# Patient Record
Sex: FEMALE | Race: WHITE | Employment: OTHER
[De-identification: names, ages, dates, MRNs, and addresses within clinical notes are randomized per-mention and may not be internally consistent; named-entity substitution may affect disease eponyms.]

---

## 2017-08-15 ENCOUNTER — HOSPITAL ENCOUNTER (OUTPATIENT)
Dept: PHYSICAL THERAPY | Facility: CLINIC | Age: 67
Setting detail: THERAPIES SERIES
Discharge: HOME | End: 2017-08-15
Payer: MEDICARE

## 2017-08-15 PROCEDURE — G0283 ELEC STIM OTHER THAN WOUND: HCPCS

## 2017-08-15 PROCEDURE — 97140 MANUAL THERAPY 1/> REGIONS: CPT

## 2017-08-15 PROCEDURE — G8978 MOBILITY CURRENT STATUS: HCPCS

## 2017-08-15 PROCEDURE — 97161 PT EVAL LOW COMPLEX 20 MIN: CPT

## 2017-08-15 PROCEDURE — G8979 MOBILITY GOAL STATUS: HCPCS

## 2017-08-15 PROCEDURE — 97110 THERAPEUTIC EXERCISES: CPT

## 2017-08-17 ENCOUNTER — HOSPITAL ENCOUNTER (OUTPATIENT)
Dept: PHYSICAL THERAPY | Facility: CLINIC | Age: 67
Setting detail: THERAPIES SERIES
Discharge: HOME | End: 2017-08-17
Payer: MEDICARE

## 2017-08-17 PROCEDURE — 97110 THERAPEUTIC EXERCISES: CPT

## 2017-08-17 PROCEDURE — G0283 ELEC STIM OTHER THAN WOUND: HCPCS

## 2017-08-22 ENCOUNTER — HOSPITAL ENCOUNTER (OUTPATIENT)
Dept: PHYSICAL THERAPY | Facility: CLINIC | Age: 67
Setting detail: THERAPIES SERIES
Discharge: HOME | End: 2017-08-22
Payer: MEDICARE

## 2017-08-22 PROCEDURE — G0283 ELEC STIM OTHER THAN WOUND: HCPCS

## 2017-08-22 PROCEDURE — 97110 THERAPEUTIC EXERCISES: CPT

## 2017-08-25 ENCOUNTER — HOSPITAL ENCOUNTER (OUTPATIENT)
Dept: PHYSICAL THERAPY | Facility: CLINIC | Age: 67
Setting detail: THERAPIES SERIES
Discharge: HOME | End: 2017-08-25
Payer: MEDICARE

## 2017-08-25 PROCEDURE — 97110 THERAPEUTIC EXERCISES: CPT

## 2017-08-25 PROCEDURE — G0283 ELEC STIM OTHER THAN WOUND: HCPCS

## 2017-08-28 ENCOUNTER — HOSPITAL ENCOUNTER (OUTPATIENT)
Dept: PHYSICAL THERAPY | Facility: CLINIC | Age: 67
Setting detail: THERAPIES SERIES
Discharge: HOME | End: 2017-08-28
Payer: MEDICARE

## 2017-08-28 PROCEDURE — 97110 THERAPEUTIC EXERCISES: CPT

## 2017-08-28 PROCEDURE — 97032 APPL MODALITY 1+ESTIM EA 15: CPT

## 2017-08-30 ENCOUNTER — APPOINTMENT (OUTPATIENT)
Dept: PHYSICAL THERAPY | Facility: CLINIC | Age: 67
End: 2017-08-30
Payer: MEDICARE

## 2017-08-31 ENCOUNTER — HOSPITAL ENCOUNTER (OUTPATIENT)
Dept: PHYSICAL THERAPY | Facility: CLINIC | Age: 67
Setting detail: THERAPIES SERIES
Discharge: HOME | End: 2017-08-31
Payer: MEDICARE

## 2017-08-31 PROCEDURE — G0283 ELEC STIM OTHER THAN WOUND: HCPCS

## 2017-08-31 PROCEDURE — 97110 THERAPEUTIC EXERCISES: CPT

## 2017-09-05 ENCOUNTER — APPOINTMENT (OUTPATIENT)
Dept: PHYSICAL THERAPY | Facility: CLINIC | Age: 67
End: 2017-09-05
Payer: MEDICARE

## 2017-09-06 ENCOUNTER — HOSPITAL ENCOUNTER (OUTPATIENT)
Dept: PHYSICAL THERAPY | Facility: CLINIC | Age: 67
Setting detail: THERAPIES SERIES
Discharge: HOME | End: 2017-09-06
Payer: MEDICARE

## 2017-09-06 PROCEDURE — 97110 THERAPEUTIC EXERCISES: CPT

## 2017-09-06 PROCEDURE — G0283 ELEC STIM OTHER THAN WOUND: HCPCS

## 2017-09-06 PROCEDURE — 97113 AQUATIC THERAPY/EXERCISES: CPT

## 2017-09-12 ENCOUNTER — HOSPITAL ENCOUNTER (OUTPATIENT)
Dept: PHYSICAL THERAPY | Facility: CLINIC | Age: 67
Setting detail: THERAPIES SERIES
Discharge: HOME | End: 2017-09-12
Payer: MEDICARE

## 2017-09-12 PROCEDURE — 97110 THERAPEUTIC EXERCISES: CPT

## 2017-09-12 PROCEDURE — G0283 ELEC STIM OTHER THAN WOUND: HCPCS

## 2017-09-14 ENCOUNTER — HOSPITAL ENCOUNTER (OUTPATIENT)
Dept: PHYSICAL THERAPY | Facility: CLINIC | Age: 67
Setting detail: THERAPIES SERIES
Discharge: HOME | End: 2017-09-14
Payer: MEDICARE

## 2017-12-15 ENCOUNTER — HOSPITAL ENCOUNTER (OUTPATIENT)
Dept: MRI IMAGING | Age: 67
Discharge: HOME | End: 2017-12-15
Payer: MEDICARE

## 2017-12-15 DIAGNOSIS — G57.31 RIGHT PERONEAL NERVE PALSY: ICD-10-CM

## 2017-12-15 PROCEDURE — 73721 MRI JNT OF LWR EXTRE W/O DYE: CPT

## 2018-04-09 ENCOUNTER — HOSPITAL ENCOUNTER (OUTPATIENT)
Dept: PHYSICAL THERAPY | Facility: CLINIC | Age: 68
Setting detail: THERAPIES SERIES
Discharge: HOME | End: 2018-04-09
Payer: MEDICARE

## 2018-04-09 PROCEDURE — G8979 MOBILITY GOAL STATUS: HCPCS

## 2018-04-09 PROCEDURE — 97161 PT EVAL LOW COMPLEX 20 MIN: CPT

## 2018-04-09 PROCEDURE — 97016 VASOPNEUMATIC DEVICE THERAPY: CPT

## 2018-04-09 PROCEDURE — G8978 MOBILITY CURRENT STATUS: HCPCS

## 2018-04-09 PROCEDURE — 97110 THERAPEUTIC EXERCISES: CPT

## 2018-04-11 ENCOUNTER — HOSPITAL ENCOUNTER (OUTPATIENT)
Dept: PHYSICAL THERAPY | Facility: CLINIC | Age: 68
Setting detail: THERAPIES SERIES
Discharge: HOME | End: 2018-04-11
Payer: MEDICARE

## 2018-04-11 PROCEDURE — 97016 VASOPNEUMATIC DEVICE THERAPY: CPT

## 2018-04-11 PROCEDURE — 97110 THERAPEUTIC EXERCISES: CPT

## 2018-04-13 ENCOUNTER — HOSPITAL ENCOUNTER (OUTPATIENT)
Dept: PHYSICAL THERAPY | Facility: CLINIC | Age: 68
Setting detail: THERAPIES SERIES
Discharge: HOME | End: 2018-04-13
Payer: MEDICARE

## 2018-04-13 PROCEDURE — 97110 THERAPEUTIC EXERCISES: CPT

## 2018-04-13 PROCEDURE — 97016 VASOPNEUMATIC DEVICE THERAPY: CPT

## 2018-04-17 ENCOUNTER — HOSPITAL ENCOUNTER (OUTPATIENT)
Dept: PHYSICAL THERAPY | Facility: CLINIC | Age: 68
Setting detail: THERAPIES SERIES
Discharge: HOME | End: 2018-04-17
Payer: MEDICARE

## 2018-04-17 PROCEDURE — 97016 VASOPNEUMATIC DEVICE THERAPY: CPT

## 2018-04-17 PROCEDURE — 97110 THERAPEUTIC EXERCISES: CPT

## 2018-04-18 ENCOUNTER — HOSPITAL ENCOUNTER (OUTPATIENT)
Dept: PHYSICAL THERAPY | Facility: CLINIC | Age: 68
Setting detail: THERAPIES SERIES
Discharge: HOME | End: 2018-04-18
Payer: MEDICARE

## 2018-04-18 PROCEDURE — 97110 THERAPEUTIC EXERCISES: CPT

## 2018-04-18 PROCEDURE — 97016 VASOPNEUMATIC DEVICE THERAPY: CPT

## 2018-04-20 ENCOUNTER — HOSPITAL ENCOUNTER (OUTPATIENT)
Dept: PHYSICAL THERAPY | Facility: CLINIC | Age: 68
Setting detail: THERAPIES SERIES
Discharge: HOME | End: 2018-04-20
Payer: MEDICARE

## 2018-04-20 PROCEDURE — 97016 VASOPNEUMATIC DEVICE THERAPY: CPT

## 2018-04-20 PROCEDURE — 97110 THERAPEUTIC EXERCISES: CPT

## 2018-04-24 ENCOUNTER — HOSPITAL ENCOUNTER (OUTPATIENT)
Dept: PHYSICAL THERAPY | Facility: CLINIC | Age: 68
Setting detail: THERAPIES SERIES
Discharge: HOME | End: 2018-04-24
Payer: MEDICARE

## 2018-04-24 PROCEDURE — 97110 THERAPEUTIC EXERCISES: CPT

## 2018-04-24 PROCEDURE — 97016 VASOPNEUMATIC DEVICE THERAPY: CPT

## 2018-04-25 ENCOUNTER — HOSPITAL ENCOUNTER (OUTPATIENT)
Dept: PHYSICAL THERAPY | Facility: CLINIC | Age: 68
Setting detail: THERAPIES SERIES
Discharge: HOME | End: 2018-04-25
Payer: MEDICARE

## 2018-04-25 PROCEDURE — 97016 VASOPNEUMATIC DEVICE THERAPY: CPT

## 2018-04-25 PROCEDURE — 97110 THERAPEUTIC EXERCISES: CPT

## 2018-04-27 ENCOUNTER — HOSPITAL ENCOUNTER (OUTPATIENT)
Dept: PHYSICAL THERAPY | Facility: CLINIC | Age: 68
Setting detail: THERAPIES SERIES
Discharge: HOME | End: 2018-04-27
Payer: MEDICARE

## 2018-04-27 PROCEDURE — 97016 VASOPNEUMATIC DEVICE THERAPY: CPT

## 2018-04-27 PROCEDURE — 97110 THERAPEUTIC EXERCISES: CPT

## 2018-05-01 ENCOUNTER — HOSPITAL ENCOUNTER (OUTPATIENT)
Dept: PHYSICAL THERAPY | Facility: CLINIC | Age: 68
Setting detail: THERAPIES SERIES
Discharge: HOME | End: 2018-05-01
Payer: MEDICARE

## 2018-05-01 PROCEDURE — G8979 MOBILITY GOAL STATUS: HCPCS

## 2018-05-01 PROCEDURE — 97016 VASOPNEUMATIC DEVICE THERAPY: CPT

## 2018-05-01 PROCEDURE — G8978 MOBILITY CURRENT STATUS: HCPCS

## 2018-05-01 PROCEDURE — 97110 THERAPEUTIC EXERCISES: CPT

## 2018-05-03 ENCOUNTER — HOSPITAL ENCOUNTER (OUTPATIENT)
Dept: PHYSICAL THERAPY | Facility: CLINIC | Age: 68
Setting detail: THERAPIES SERIES
Discharge: HOME | End: 2018-05-03
Payer: MEDICARE

## 2018-05-03 PROCEDURE — 97110 THERAPEUTIC EXERCISES: CPT

## 2018-05-03 PROCEDURE — 97016 VASOPNEUMATIC DEVICE THERAPY: CPT

## 2018-05-04 ENCOUNTER — HOSPITAL ENCOUNTER (OUTPATIENT)
Dept: PHYSICAL THERAPY | Facility: CLINIC | Age: 68
Setting detail: THERAPIES SERIES
Discharge: HOME | End: 2018-05-04
Payer: MEDICARE

## 2018-05-04 PROCEDURE — 97110 THERAPEUTIC EXERCISES: CPT

## 2018-05-04 PROCEDURE — 97016 VASOPNEUMATIC DEVICE THERAPY: CPT

## 2018-05-08 ENCOUNTER — HOSPITAL ENCOUNTER (OUTPATIENT)
Dept: PHYSICAL THERAPY | Facility: CLINIC | Age: 68
Setting detail: THERAPIES SERIES
Discharge: HOME | End: 2018-05-08
Payer: MEDICARE

## 2018-05-08 PROCEDURE — 97110 THERAPEUTIC EXERCISES: CPT

## 2018-05-08 PROCEDURE — 97016 VASOPNEUMATIC DEVICE THERAPY: CPT

## 2018-05-10 ENCOUNTER — HOSPITAL ENCOUNTER (OUTPATIENT)
Dept: PHYSICAL THERAPY | Facility: CLINIC | Age: 68
Setting detail: THERAPIES SERIES
Discharge: HOME | End: 2018-05-10
Payer: MEDICARE

## 2018-05-10 PROCEDURE — 97016 VASOPNEUMATIC DEVICE THERAPY: CPT

## 2018-05-10 PROCEDURE — 97110 THERAPEUTIC EXERCISES: CPT

## 2018-05-15 ENCOUNTER — HOSPITAL ENCOUNTER (OUTPATIENT)
Dept: PHYSICAL THERAPY | Facility: CLINIC | Age: 68
Setting detail: THERAPIES SERIES
Discharge: HOME | End: 2018-05-15
Payer: MEDICARE

## 2018-05-15 PROCEDURE — 97110 THERAPEUTIC EXERCISES: CPT

## 2018-05-15 PROCEDURE — 97016 VASOPNEUMATIC DEVICE THERAPY: CPT

## 2018-05-23 ENCOUNTER — HOSPITAL ENCOUNTER (OUTPATIENT)
Dept: PHYSICAL THERAPY | Facility: CLINIC | Age: 68
Setting detail: THERAPIES SERIES
Discharge: HOME | End: 2018-05-23
Payer: MEDICARE

## 2018-05-23 PROCEDURE — 97016 VASOPNEUMATIC DEVICE THERAPY: CPT

## 2018-05-23 PROCEDURE — 97110 THERAPEUTIC EXERCISES: CPT

## 2018-05-25 ENCOUNTER — HOSPITAL ENCOUNTER (OUTPATIENT)
Dept: PHYSICAL THERAPY | Facility: CLINIC | Age: 68
Setting detail: THERAPIES SERIES
Discharge: HOME | End: 2018-05-25
Payer: MEDICARE

## 2018-05-25 PROCEDURE — 97016 VASOPNEUMATIC DEVICE THERAPY: CPT

## 2018-05-25 PROCEDURE — 97110 THERAPEUTIC EXERCISES: CPT

## 2018-05-29 ENCOUNTER — HOSPITAL ENCOUNTER (OUTPATIENT)
Dept: PHYSICAL THERAPY | Facility: CLINIC | Age: 68
Setting detail: THERAPIES SERIES
Discharge: HOME | End: 2018-05-29
Payer: MEDICARE

## 2018-05-29 PROCEDURE — 97110 THERAPEUTIC EXERCISES: CPT

## 2018-05-29 PROCEDURE — 97016 VASOPNEUMATIC DEVICE THERAPY: CPT

## 2018-05-31 ENCOUNTER — HOSPITAL ENCOUNTER (OUTPATIENT)
Dept: PHYSICAL THERAPY | Facility: CLINIC | Age: 68
Setting detail: THERAPIES SERIES
Discharge: HOME | End: 2018-05-31
Payer: MEDICARE

## 2018-05-31 PROCEDURE — 97110 THERAPEUTIC EXERCISES: CPT

## 2018-05-31 PROCEDURE — 97016 VASOPNEUMATIC DEVICE THERAPY: CPT

## 2018-05-31 PROCEDURE — G8979 MOBILITY GOAL STATUS: HCPCS

## 2018-05-31 PROCEDURE — 97530 THERAPEUTIC ACTIVITIES: CPT

## 2018-05-31 PROCEDURE — G8980 MOBILITY D/C STATUS: HCPCS

## 2020-08-31 ENCOUNTER — HOSPITAL ENCOUNTER (OUTPATIENT)
Dept: PHYSICAL THERAPY | Facility: CLINIC | Age: 70
Setting detail: THERAPIES SERIES
Discharge: HOME | End: 2020-08-31
Payer: MEDICARE

## 2020-08-31 PROCEDURE — 97110 THERAPEUTIC EXERCISES: CPT

## 2020-08-31 PROCEDURE — 97161 PT EVAL LOW COMPLEX 20 MIN: CPT

## 2020-08-31 NOTE — CONSULTS
[] [facility]) - [facility] &  Therapy  [address]  [phone]  F: [phone] [] [address]  [place] [address]   [address]  P: [phone]  F: [phone] [] [address] &  Therapy  [address] [facility]  P: [phone]  F: [phone] [] [address]  [facility]   [address]   [place]: [phone]  F: [phone]      Physical Therapy General Evaluation    Date:  2020  Patient: Roman Rivas  : 1950  MRN: [id number]  Physician: Fay Liu     Insurance: Medicare  Medical Diagnosis: R26.89- imbalance     Rehab Codes: R26.89  Onset Date: 2020- fall                                 Next 's appt: TBD- pending success with PT services    Subjective:   CC: Reports x1 fall at home onto carpet on 2020- with resulting L hip pain. Also reports previous L TKA with PT services with relief, however, unable to undergo R TKA secondary to COVID-19 and resulting weight gain. Denies all other falls as of recent- fell from low chair onto sand over weekend- no sig change in pain. Denies previous PT services for imbalance. Relief with recent injection for R knee- potential synvisc. Current L hip pain- diagnosed as bursitis- unable to perform interventions issued by referring MD secondary to imbalance and inc pain, difficulty. No recent injections [synvisc] for L hip bursitis- no relief with previous cortisone injections. HPI: 2020; see above     PMHx: [] Unremarkable [] Diabetes [x] HTN  [] Pacemaker   [] MI/Heart Problems [] Cancer [x] Arthritis [x] Other: hearing problems. [x] Refer to full medical chart  In EPIC     Tests: [x] X-Ray: [] MRI:  [] Other: \"bone spur. \"     Medications: [x] Refer to full medical record [] None [] Other: denies use of pain medication for L hip at present. Allergies:      [x] Refer to full medical record [] None [] Other:     Function:  Hand Dominance  [x] Right  [] Left  Employer -   Job Status []  Normal duty   [] Light duty   [] Off due to condition    [x]  Retired   [] Not employed   [] Disability  [] Other:  []  Return to work: Work activities/duties -     Gait Prior level of function Current level of function    [x] Independent  [] Assist [x] Independent  [] Assist   Device: [] Independent [] Independent    [] Straight Cane [] Quad cane [] Straight Cane [] Quad cane    [] Standard walker [] Rolling walker   [] 4 wheeled walker [] Standard walker [] Rolling walker   [] 4 wheeled walker    [] Wheelchair [] Wheelchair     Pain:  [x] Yes  [] No Location: L lat hip- GT, bursa, into ITB  Pain Rating: (0-10 scale) 6/10  Pain altered Tx:  [] Yes  [x] No  Action: N/A  Symptoms:  [x] Improving [] Worsening [] Same  Better:  [] AM    [] PM    [] Sit    [] Rise/Sit    []Stand    [] Walk    [] Lying    [] Other:  Worse: [] AM    [] PM    [] Sit    [x] Rise/Sit    [x]Stand    [x] Walk    [] Lying    [] Bend                      [] Valsalva    [] Other: stair negotiation- inc B UE A; limited in babysitting as well. Sleep: [] OK    [x] Disturbed- unable to sleep in L S/L    Reports inc B foot N/T as of recent- denies lumbar spine history, no recent imaging- instructed to monitor and notify us of changes. Reports recent inc in shuffling gait. Objective:      ROM  ° A/P STRENGTH      Left Right Left Right     Shoulder Flex         Abd         Elbow Flex          Ext          Wrist Flex         Ext         Hip IR/ER   4-/4- 4/4     Hip Flex   4- 4     Ext   - -      Abd   4- with pain/4 4/4      Knee Flex   4 4      Ext   4 4+      Ankle DF   4+ 4+      PF   4+ 4+        OBSERVATION No Deficit Deficit Not Tested Comments   Posture       Forward Head [] [x] []    Rounded Shoulders [] [x] []    Kyphosis [] [x] [] Inc cerv, thoracic.    Lordosis [x] [] [] and consider a follow-up visit with said physician. Patient verbalized understanding and agreement to all aforementioned statements. Patient would benefit from skilled physical therapy services in order to: inc L ITB/TFL/piriformis flexibility; L LE gross strength; and overall sit<>stand, standing, ambulation and stair negotiation tolerance. Problems:    [x] ? Pain:  [x] ? ROM:  [x] ? Strength:  [x] ? Function:  [] Other:      STG: (to be met in 6 treatments)  1. ? Pain: Patient will report < 6/10 pain with active movement in order to improve QOL. 2. ? ROM: Will demo dec trunk de-rotation and compensations with < 6/10 pain during L britney's test in order to indicate inc L ITB/TFL flexibility. Will perform all L ITB/TFL/piriformis stretches through inc ROM, with dec difficulty. 3. ? Strength: Will demo 4/5 gross L LE strength with < 3/10 P in order to better match R.   4. ? Function: Will demo > 12 reps 30\" sit<>stand and with < 3/10 B knee pain in order to indicate dec fall risk at home. 5. Patient to be independent with home exercise program as demonstrated by performance with correct form without cues. 6. Demonstrate Knowledge of fall prevention  LTG: (to be met in 12 treatments)  1. Will demo GOOD to FAIR conditions 3-4 mCTSIB in order to indicate improved static standing balance, with dec fall risk at home. 2. Patient will report decreased TTP to L hip GT, bursa, ITB in order to indicate decreased inflammation and improved healing of injured site. 3. Will stand, ambulate, negotiate stairs for 10-15 min each with < 3/10 pain and dec UE A in order to improve QOL, return to fitness. 4. Improve LEFS to 23/80    Patient goals: \"get better balance. \"     Rehab Potential:  [x] Good  [] Fair  [] Poor   Suggested Professional Referral:  [x] No  [] Yes:  Barriers to Goal Achievement:  [] No  [x] Yes: prolonged or chronic history of current condition; history of x1/2 falls; concurrent R knee pain; h/o L TKA.   Domestic Concerns:  [x] No  [] Yes:    Pt. Education:  [x] Plans/Goals, Risks/Benefits discussed  [x] Home exercise program  Method of Education: [x] Verbal  [x] Demo  [x] Written  Comprehension of Education:  [x] Verbalizes understanding. [] Demonstrates understanding. [x] Needs Review. [] Demonstrates/verbalizes understanding of HEP/Ed previously given. Treatment Plan:  [x] Therapeutic Exercise   11027  [] Iontophoresis: 4 mg/mL Dexamethasone Sodium Phosphate  mAmin  91462   [x] Therapeutic Activity  28492 [] Vasopneumatic cold with compression  89575    [] Gait Training   14981 [] Ultrasound   12893   [x] Neuromuscular Re-education  34918 [] Electrical Stimulation Unattended  70060   [x] Manual Therapy  98583 [] Electrical Stimulation Attended  00711   [x] Instruction in HEP  [] Lumbar/Cervical Traction  00671   [x] Aquatic Therapy   75180 [x] Cold/hotpack    [] Massage   60821      [] Dry Needling, 1 or 2 muscles  80095   [] Biofeedback, first 15 minutes   95771  [] Biofeedback, additional 15 minutes   43887 [] Dry Needling, 3 or more muscles  40314     []  Medication allergies reviewed for use of    Dexamethasone Sodium Phosphate 4mg/ml     with iontophoresis treatments. Pt is not allergic. Frequency:  2 x/week for 12 visits    Todays Treatment:  Modalities: x1 aquatic, x1 land combo treatments for L hip GT bursitis and imbalance; FALL RISK- x1/2 isolated falls in 5/1/2020  Exercises: incorporate balance re-training; plans to begin weight management to work toward R TKA  Exercise Reps/ Time Weight/ Level Comments   Supine L piriformis str. - ER/IR- modified/to tolerance  X15\" ea  Towel to support R lower leg- for dec tenderness   HEP   Supine SKTC str. With towel A X15\" ea  HEP   S/L clam- L x10 AROM HEP   Supine L ITB str.  With belt   HEP- review next land visit    Education   HOLD- standing L ITB stretching, traditional figure four stretch secondary to sig difficulty- may progress to

## 2020-09-03 ENCOUNTER — HOSPITAL ENCOUNTER (OUTPATIENT)
Dept: PHYSICAL THERAPY | Facility: CLINIC | Age: 70
Setting detail: THERAPIES SERIES
Discharge: HOME | End: 2020-09-03
Payer: MEDICARE

## 2020-09-03 PROCEDURE — 97113 AQUATIC THERAPY/EXERCISES: CPT

## 2020-09-03 NOTE — FLOWSHEET NOTE
[] [facility]) - [facility] &  Therapy  [address]  [phone]  F: [phone] [] [address]  [place] [address]   [address]  P: [phone]  F: [phone] [x] [address] &  Therapy  [address]  P: [phone]  F: [phone] [] [address]  [facility]   [address]   [place]: [phone]  F: [phone]      Physical Therapy Daily Treatment Note    Date:  9/3/2020  Patient Name:  Filiberto Wade    :  1950  MRN: [id number]  Physician: Daniela Arce                            Insurance: Medicare  Medical Diagnosis: R26.89- imbalance                     Rehab Codes: R26.89  Onset Date: 2020- fall                                 Next 's appt: TBD- pending success with PT services   Visit# / total visits: , Progress note for Medicare patient due at visit 10     Cancels/No Shows: 0/0    Subjective:    Pain:  [x] Yes  [] No Location: L hip  Pain Rating: (0-10 scale) 4/10  Pain altered Tx:  [x] No  [] Yes  Action:  Comments: Pt reports feeling more sore in her hip after evaluation.     Objective:  KEY  B = Belt G = Gloves P= Paddles   C = Collar K = Kickboard T = Theratube   DB = Dumbells N = Noodle W = Weights     Exercises/Activities  Warm-up/Amb 9/3 Dynamic Exercises 9/3   Forward 3L March 3L   Sideways 3L Squat    Backwards 3L Retro HS curls      Retro SLR    Stretches  Braiding    Gastroc/Soleus  Heel to Toe amb    Hamstring 3x30\" B Toe amb    Hip flexor  Heel amb    Piriformis      SKTC      Pec Stretch      Post Deltoid  Static Exercises UE      Shoulder flex/ext 15   Static Exercises LE  Shoulder abd/add 15   Heel/toe raises 15 Shoulder H.  abd/add 15   Marches 15 Shoulder IR/ER    Mini-squats 15 Rowing    4-way hip  15 Arm Circles    Hamstring curls 15 UT shrugs/rolls    Hip Circles/Fig 8  Scap squeezes    Ankle ROM  Diagonals 1/2    Lunges  15 B Elbow flex/ext      Pron/Sup    Functional Exercise  Wrist AROM    Step - fwd/lat 15 ea      Wall Push-ups  Deep H20/    SLS 2x20\" ea Bike 3m   Breast Stroke on Noodle  Hip abd/add 3m   Noodle Twist  Hip flex/ext    Noodle Push down  Hip IR/ER    Kickboard push/pull  Knee flex/ext      Push/pull on Rail      Hang 5-10m   Other:    Treatment Charges: Mins Units   []  Modalities     []  Ther Exercise     []  Manual Therapy     []  Ther Activities     [x]  Aquatics 30 2   []  Vasocompression     []  Other     Total Treatment time       Medicare- $161.15 - updated 9/3    Assessment: [x] Progressing toward goals.  Initiated aquatic exercises above with good tolerance. Education provided on proper depth to complete ex in along with demonstration of all. Cues throughout for TA activation for strengthening and to assist with balance. Pt reports \"feeling great\" post session. [] No change. [] Other:  [x] Patient would continue to benefit from skilled physical therapy services in order to: decrease pain, increase ROM and strength to improve QOL    STG: (to be met in 6 treatments)  1. ? Pain: Patient will report < 6/10 pain with active movement in order to improve QOL. 2. ? ROM: Will demo dec trunk de-rotation and compensations with < 6/10 pain during L britney's test in order to indicate inc L ITB/TFL flexibility. Will perform all L ITB/TFL/piriformis stretches through inc ROM, with dec difficulty. 3. ? Strength: Will demo 4/5 gross L LE strength with < 3/10 P in order to better match R.   4. ? Function: Will demo > 12 reps 30\" sit<>stand and with < 3/10 B knee pain in order to indicate dec fall risk at home. 5. Patient to be independent with home exercise program as demonstrated by performance with correct form without cues. 6. Demonstrate Knowledge of fall prevention  LTG: (to be met in 12 treatments)  1.  Will demo GOOD to FAIR conditions

## 2020-09-08 ENCOUNTER — HOSPITAL ENCOUNTER (OUTPATIENT)
Dept: PHYSICAL THERAPY | Facility: CLINIC | Age: 70
Setting detail: THERAPIES SERIES
Discharge: HOME | End: 2020-09-08
Payer: MEDICARE

## 2020-09-08 PROCEDURE — 97110 THERAPEUTIC EXERCISES: CPT

## 2020-09-08 PROCEDURE — 97140 MANUAL THERAPY 1/> REGIONS: CPT

## 2020-09-08 NOTE — FLOWSHEET NOTE
[] [facility]) - [facility] &  Therapy  [address]  [phone]  F: [phone] [] [address]  [place] [address]   [address]  P: [phone]  F: [phone] [x] [address] &  Therapy  [address]  P: [phone]  F: [phone] [] [address]  [facility]   [address]   [place]: [phone]  F: [phone]      Physical Therapy Daily Treatment Note    Date:  2020  Patient Name:  Khadra Vigil    :  1950  MRN: [id number]  Physician: April Matos                            Insurance: Medicare  Medical Diagnosis: R26.89- imbalance                     Rehab Codes: R26.89  Onset Date: 2020- fall                                 Next 's appt: TBD- pending success with PT services   Visit# / total visits: 3/12, Progress note for Medicare patient due at visit 10                                   Cancels/No Shows: 0/0    Subjective:    Pain:  [x] Yes  [] No Location: L hip GT bursa Pain Rating: (0-10 scale) unrated/10  Pain altered Tx:  [] No  [x] Yes  Action: SEE MODIFICATIONS BELOW   Comments: Reports sig relief with first aquatic PT treatment. Reports sig inc pain with performing L piriformis stretches at home- will review today. Plans to participate in adult open swim on / for further relief of symptoms. Reports able to sleep in L S/L since start of PT services. Todays Treatment:  Modalities: x1 aquatic, x1 land combo treatments for L hip GT bursitis and imbalance; FALL RISK- x1/2 isolated falls in 2020  Exercises: incorporate balance re-training; plans to begin weight management to work toward R TKA  Exercise Reps/ Time Weight/ Level Comments   Supine L piriformis str. - ER/IR- modified/to tolerance  X15\" ea   Towel to support R lower leg- for dec tenderness HEP- reviewed  To 30\"- 9/8/2020- x4- ER/IR  Towel A for IR- improved ROM compared with eval    Supine SKTC str. With towel A X15\" ea   HEP- reviewed   To 30\"- 9/8/2020- x4   S/L clam- L X20, 5\" ea AROM HEP- reviewed  B- instructed to perform through dec ROM, no breaks    Reverse clam X20, 5\" AROM B  New- 9/8/2020   Supine bridge  X20, 3\"  New- 9/8/2020   Supine L ITB str. With belt     HEP- reviewed   Education     HOLD- standing L ITB stretching, traditional figure four stretch secondary to sig difficulty- may progress to in future; also aquatic PT services- rationale, expectations; precautions/contraindications; x1 aquatic, x1 land combo treatments- verbalized understanding to all    Issue fall prevention handouts     COMPLETED- 9/8/2020   Nu-step x7' L4 B UE/LE                       Other:    Foam roller to L ITB/TFL/piriformis/glutes with 1/2 black FR and MOD OP- reports relief- x10' total      Specific Instructions for next treatment: Issue new, updated HEP handout next land visit. x1 aquatic, x1 land combo treatments for L hip GT bursitis and imbalance starting next visit. Treatment Charges: Mins Units   []  Modalities     [x]  Ther Exercise 35 2   [x]  Manual Therapy 10 1   []  Ther Activities     []  Aquatics     []  Vasocompression     []  Other     Total Treatment time 45 3     Medicare- $224.09- updated 9/8    [x7' on nu-step]    Assessment: [x] Progressing toward goals. Pt presents with unrated L hip pain, and reports sig relief post first aquatic PT treatment. Therefore, began session with nu-step warm-up, and proceeded with review of all HEP interventions in order to ensure proper form. Able to progress to performing 30 second hold durations of all L hip active stretches. Also able to perform S/L clam, reverse clam on B LEs this date; as well as supine bridges- without inc pain. Finished with foam roller to L hip musculature for inc tissue flexibility and pain management- reports relief. Consider ideas above and below next visit. [] No change. [] Other:  [x] Patient would continue to benefit from skilled physical therapy services in order to: inc L ITB/TFL/piriformis flexibility; L LE gross strength; and overall sit<>stand, standing, ambulation and stair negotiation tolerance. STG: (to be met in 6 treatments)  1. ? Pain: Patient will report < 6/10 pain with active movement in order to improve QOL. 2. ? ROM: Will demo dec trunk de-rotation and compensations with < 6/10 pain during L britney's test in order to indicate inc L ITB/TFL flexibility. Will perform all L ITB/TFL/piriformis stretches through inc ROM, with dec difficulty. 3. ? Strength: Will demo 4/5 gross L LE strength with < 3/10 P in order to better match R.   4. ? Function: Will demo > 12 reps 30\" sit<>stand and with < 3/10 B knee pain in order to indicate dec fall risk at home. 5. Patient to be independent with home exercise program as demonstrated by performance with correct form without cues. 6. Demonstrate Knowledge of fall prevention  LTG: (to be met in 12 treatments)  1. Will demo GOOD to FAIR conditions 3-4 mCTSIB in order to indicate improved static standing balance, with dec fall risk at home. 2. Patient will report decreased TTP to L hip GT, bursa, ITB in order to indicate decreased inflammation and improved healing of injured site. 3. Will stand, ambulate, negotiate stairs for 10-15 min each with < 3/10 pain and dec UE A in order to improve QOL, return to fitness. 4. Improve LEFS to 23/80     Patient goals: \"get better balance. \"     Pt. Education:  [x] Yes  [] No  [x] Reviewed Prior HEP/Ed  Method of Education: [] Verbal  [] Demo  [] Written  Comprehension of Education:  [] Verbalizes understanding. [] Demonstrates understanding. [] Needs review. [x] Demonstrates/verbalizes HEP/Ed previously given. Plan: [x] Continue current frequency toward long and short term goals.     [x] Specific Instructions for subsequent treatments: Issue new, updated HEP handout next land visit.       Time In: 9:03AM            Time Out: 9:55AM    Electronically signed by:  Florencio Gonsales PT

## 2020-09-10 ENCOUNTER — APPOINTMENT (OUTPATIENT)
Dept: PHYSICAL THERAPY | Facility: CLINIC | Age: 70
End: 2020-09-10
Payer: MEDICARE

## 2020-09-11 ENCOUNTER — HOSPITAL ENCOUNTER (OUTPATIENT)
Dept: PHYSICAL THERAPY | Facility: CLINIC | Age: 70
Setting detail: THERAPIES SERIES
Discharge: HOME | End: 2020-09-11
Payer: MEDICARE

## 2020-09-11 PROCEDURE — 97113 AQUATIC THERAPY/EXERCISES: CPT

## 2020-09-11 NOTE — FLOWSHEET NOTE
[] [facility]       Physical Therapy [facility]       [place] [address]        [address]       Phone: [phone]       Fax: [phone] [] [facility]       Promotion at [address]       Phone: [phone]       Fax: [phone] [x] [place]. [address] [place] [address] for Health Promotion  [address] [place] [address]   Phone: [phone]   Fax:  [phone]     Physical Therapy Daily  Aquatic Treatment Note    Date:  2020  Patient Name:  Maxim Sharma    :  1950  MRN: [id number]  Physician: April Duran Diagnosis: R26.89- imbalance                     Rehab Codes: R26.89  Onset Date: 2020- fall                                 Next 's appt: TBD- pending success with PT services  Visit# / total visits: , Progress note for Medicare patient due at visit 10                                   Cancels/No Shows: 0/0    Subjective:    Pain:  [x] Yes  [] No Location: L buttock   Pain Rating: (0-10 scale) unrated/10  Pain altered Tx:  [x] No  [] Yes  Action:  Comments: Pt mentioned that she usually has pain down the outside of her left leg but today it has shifted into her butt.       Objective:     KEY  B = Belt G = Gloves N = Noodle   C = Cuffs K = Kickboard P = Paddles   CC = Cervical Collar L = Laps T = Theratube   DB = Dumbells M = Minutes W = Weights     Exercises/Activities  Warm-up/Amb  Dynamic Exercises    Forward 4L March 4L   Sideways 4L Squat    Backwards 4L Retro HS curls 4L     Retro SLR    Stretches  Braiding    Gastroc/Soleus  Heel to Toe amb    Hamstring 3x30\" Toe amb    Hip flexor  Heel amb    Piriformis 3x30\"     SKTC      Pec Stretch      Post Deltoid  Static Exercises UE      Shoulder flex/ext    Static Exercises LE  Shoulder abd/add    Heel/toe raises  Shoulder H.  abd/add    Marches  Shoulder IR/ER    Mini-squats  Rowing    4-way hip   Arm Circles    Hamstring curls  UT shrugs/rolls    Hip Circles/Fig 8  Scap squeezes    Ankle ROM  Diagonals 1/2    Lunges   Elbow flex/ext      Pron/Sup    Functional Exercise  Wrist AROM    Step      Wall Push-ups  Deep H20/    SLS  Bike 5m   Breast Stroke on Noodle  Hip abd/add 5m   Noodle Twist  Hip flex/ext    Noodle Push down  Hip IR/ER    Kickboard push/pull  Knee flex/ext      Push/pull on [employer] 5m   Other:    Specific Instructions for next treatment:    Treatment Charges: Mins Units   []  Modalities     []  Ther Exercise     []  Manual Therapy     []  Ther Activities     [x]  Aquatics 25 2   []  Other       Medicare- $279.20- updated 9/11    Assessment: [x] Progressing toward goals. [] No change. [x] Other: D/t lack of time allotted in the pool today focused treatment on dynamic exercises to increase range, loosen up tight mm, and increase endurance in B LE today. Will add in static and balance exercises next session. STG: (to be met in 6 treatments)  1. ? Pain: Patient will report < 6/10 pain with active movement in order to improve QOL. 2. ? ROM: Will demo dec trunk de-rotation and compensations with < 6/10 pain during L britney's test in order to indicate inc L ITB/TFL flexibility. Will perform all L ITB/TFL/piriformis stretches through inc ROM, with dec difficulty.   3. ? Strength: Will demo 4/5 gross L LE strength with < 3/10 P in order to better match R.   4. ? Function: Will demo > 12 reps 30\" sit<>stand and with < 3/10 B knee pain in order to indicate dec fall risk at home.   5. Patient to be independent with home exercise program as demonstrated by performance with correct form without cues. 6. Demonstrate Knowledge of fall prevention  LTG: (to be met in 12 treatments)  1.  Will demo GOOD to FAIR conditions 3-4 mCTSIB in order to indicate improved static standing balance, with dec fall risk at home.   2. Patient will report decreased TTP to L hip GT, bursa, ITB in order to indicate

## 2020-09-14 ENCOUNTER — HOSPITAL ENCOUNTER (OUTPATIENT)
Dept: PHYSICAL THERAPY | Facility: CLINIC | Age: 70
Setting detail: THERAPIES SERIES
Discharge: HOME | End: 2020-09-14
Payer: MEDICARE

## 2020-09-14 PROCEDURE — 97140 MANUAL THERAPY 1/> REGIONS: CPT

## 2020-09-14 PROCEDURE — 97110 THERAPEUTIC EXERCISES: CPT

## 2020-09-14 NOTE — FLOWSHEET NOTE
[] [facility]) - [facility] &  Therapy  [address]  [phone]  F: [phone] [] [address]  [facility]   [address]  P: [phone]  F: [phone] [x] [address] &  Therapy  [address] [facility]  P: [phone]  F: [phone] [] [address]  [facility]   [address]   [place]: [phone]  F: [phone]      Physical Therapy Daily Treatment Note    Date:  2020  Patient Name:  Kevin Tomas    :  1950  MRN: [id number]  Physician: April Matos                            Insurance: Medicare  Medical Diagnosis: R26.89- imbalance                     Rehab Codes: R26.89  Onset Date: 2020- fall                                 Next 's appt: TBD- pending success with PT services   Visit# / total visits: , Progress note for Medicare patient due at visit 10                                   Cancels/No Shows: 0/0    Subjective:    Pain:  [x] Yes  [] No Location: L hip GT bursa [in past]; L buttock [at present] Pain Rating: (0-10 scale) 7/10  Pain altered Tx:  [x] No  [] Yes  Action:   Comments: Reports pain has now traveled to L buttock, beginning over this past weekend. Denies LOB, falls, and specific RACHEL/aggravating factors to cause this change in pain and symptoms. However, reports participating in aquatic treatment session on Friday, and then immediately participating in aquatic exercise class- which may have contributed to her current pain and symptoms. Pt admits potential over-activity during this exercise class, as was not experiencing pain or symptoms at the time. Also reports waxing her sailboat yesterday- again, may be contributing to symptoms.      Ambulates into clinic with SPC in L UE and at inc height- adjusted for her height and educated regarding 3-point gait pattern with SPC in R UE for dec pain through L LE- verbalized understanding to all. Reports performing 30\" hold durations of all HEP stretches as of recent. Todays Treatment:  Modalities: x1 aquatic, x1 land combo treatments for L hip GT bursitis and imbalance; FALL RISK- x1/2 isolated falls in 5/1/2020  Exercises: incorporate balance re-training; plans to begin weight management to work toward R TKA  Exercise Reps/ Time Weight/ Level Comments   Supine L piriformis str. - ER/IR- modified/to tolerance  X15\" ea   Towel to support R lower leg- for dec tenderness   HEP- reviewed  To 30\"- 9/8/2020- x4- ER/IR  Towel A for IR- improved ROM compared with eval    Supine SKTC str. With towel A X15\" ea   HEP- reviewed   To 30\"- 9/8/2020- x4   S/L clam- L X20 ea AROM HEP- reviewed  B- instructed to perform through dec ROM, no breaks    Reverse clam X20 AROM B  New- 9/8/2020  HEP- 9/14/2020   Supine bridge  2x10, 3\"  New- 9/8/2020  Break between sets   HEP- 9/14/2020   Supine L ITB str. With belt     HEP- reviewed   Education     HOLD- standing L ITB stretching, traditional figure four stretch secondary to sig difficulty- may progress to in future; also aquatic PT services- rationale, expectations; precautions/contraindications; x1 aquatic, x1 land combo treatments- verbalized understanding to all    Issue fall prevention handouts     COMPLETED- 9/8/2020   Nu-step x7' L2 B UE/LE  Instructed to perform through tolerable ROM, speed   Reports relief    Gait training x2 laps    See subjective above; completed- 9/14/2020   HEP education   SEE BELOW- 9/14/2020                                     Other:    Foam roller to L ITB/TFL/piriformis/glutes with 1/2 black FR and MOD OP- reports relief- x10' total     Specific Instructions for next treatment: Follow-up with pt regarding tolerance to new, updated HEP handout- continue if proven beneficial or modify PRN.     x1 aquatic, x1 land combo treatments for L hip GT bursitis and imbalance starting next visit. Treatment Charges: Mins Units   []  Modalities     [x]  Ther Exercise 40 3   [x]  Manual Therapy 15 1   []  Ther Activities     []  Aquatics     []  Vasocompression     []  Other     Total Treatment time 55 4     Medicare- $310.25- updated 9/14/2020    [x7' on nu-step]    Assessment: [] Progressing toward goals. [] No change. [x] Other: Pt presents with sig high L buttock pain, but unable to report specific RACHEL or aggravating factors for this change in pain and symptoms. However, reports aquatic treatment Friday, followed immediately with an aquatic exercise class- potential for over-activity. Also reports waxing her sailboat for x1 hour yesterday. Therefore, did not progress interventions this date- simply reviewed from last visit and issued new, updated HEP handout secondary to good tolerance. Inc focus upon FR to L piriformis this date and emphasized sitting upon CP at home for dec inflammation and improved pain management. Also emphasized use of SPC in R UE at appropriate height for improved tolerance to ambulation with dec L LE pain. Reports inc flexibility at finish with 6/10 pain. Continue with aquatic treatment next visit- may inc duration of decompression interventions for inc pain management. [x] Patient would continue to benefit from skilled physical therapy services in order to: inc L ITB/TFL/piriformis flexibility; L LE gross strength; and overall sit<>stand, standing, ambulation and stair negotiation tolerance. STG: (to be met in 6 treatments)  1. ? Pain: Patient will report < 6/10 pain with active movement in order to improve QOL. 2. ? ROM: Will demo dec trunk de-rotation and compensations with < 6/10 pain during L britney's test in order to indicate inc L ITB/TFL flexibility. Will perform all L ITB/TFL/piriformis stretches through inc ROM, with dec difficulty. 3. ? Strength:  Will demo 4/5 gross L LE strength with < 3/10 P in order to better match R. 4. ? Function: Will demo > 12 reps 30\" sit<>stand and with < 3/10 B knee pain in order to indicate dec fall risk at home. 5. Patient to be independent with home exercise program as demonstrated by performance with correct form without cues. 6. Demonstrate Knowledge of fall prevention  LTG: (to be met in 12 treatments)  1. Will demo GOOD to FAIR conditions 3-4 mCTSIB in order to indicate improved static standing balance, with dec fall risk at home. 2. Patient will report decreased TTP to L hip GT, bursa, ITB in order to indicate decreased inflammation and improved healing of injured site. 3. Will stand, ambulate, negotiate stairs for 10-15 min each with < 3/10 pain and dec UE A in order to improve QOL, return to fitness. 4. Improve LEFS to 23/80     Patient goals: \"get better balance. \"     Pt. Education:  [x] Yes  [] No  [] Reviewed Prior HEP/Ed  Method of Education: [x] Verbal  [x] Demo  [x] Written  Comprehension of Education:  [x] Verbalizes understanding. [] Demonstrates understanding. [x] Needs review. [] Demonstrates/verbalizes HEP/Ed previously given. 9/14/2020- Issued all above labeled as HEP in grid for home- verbalized understanding to all. Issued new, updated HEP handout. Plan: [x] Continue current frequency toward long and short term goals. [x] Specific Instructions for subsequent treatments: Follow-up with pt regarding tolerance to new, updated HEP handout- continue if proven beneficial or modify PRN.        Time In: 1:00PM          Time Out: 2PM    Electronically signed by:  Efraín Renteria, PT

## 2020-09-17 ENCOUNTER — HOSPITAL ENCOUNTER (OUTPATIENT)
Dept: PHYSICAL THERAPY | Facility: CLINIC | Age: 70
Setting detail: THERAPIES SERIES
Discharge: HOME | End: 2020-09-17
Payer: MEDICARE

## 2020-09-17 PROCEDURE — 97113 AQUATIC THERAPY/EXERCISES: CPT

## 2020-09-17 NOTE — FLOWSHEET NOTE
[] [facility] Outpt       Physical Therapy MOB2       [address]        [address]       Phone: [phone]       Fax: [phone] [] [facility]       Promotion at [address] [facility]       Phone: [phone]       Fax: [phone] [x] [place] [facility] for [facility] Promotion  [address]   Phone: [phone]   Fax:  [phone]     Physical Therapy Daily  Aquatic Treatment Note    Date:  2020  Patient Name:  Connor Tapia    :  1950  MRN: [id number]  Physician: April Duran Diagnosis: R26.89- imbalance                     Rehab Codes: R26.89  Onset Date: 2020- fall                                 Next 's appt: TBD- pending success with PT services  Visit# / total visits: , Progress note for Medicare patient due at visit 10                                   Cancels/No Shows: 0/0    Subjective:    Pain:  [x] Yes  [] No Location: L buttock   Pain Rating: (0-10 scale) 4/10  Pain altered Tx:  [x] No  [] Yes  Action:  Comments: Pt reports continued pain the in buttocks, think she may have overdone it cleaning yesterday.       Objective:     KEY  B = Belt G = Gloves N = Noodle   C = Cuffs K = Kickboard P = Paddles   CC = Cervical Collar L = Laps T = Theratube   DB = Dumbells M = Minutes W = Weights     Exercises/Activities  Warm-up/Amb  Dynamic Exercises    Forward 4L 4L March 4L    Sideways 4L 4L Squat     Backwards 4L 4L Retro HS curls 4L       Retro SLR     Stretches   Braiding     Gastroc/Soleus  3x30\" Heel to Toe amb     Hamstring 3x30\" 3x30\" Toe amb     Hip flexor   Heel amb     Piriformis 3x30\"       SKTC        Pec Stretch        Post Deltoid   Static Exercises UE        Shoulder flex/ext  15   Static Exercises LE   Shoulder abd/add  15   Heel/toe raises  15 Shoulder H.  abd/add  15   March  15 Shoulder IR/ER

## 2020-09-18 ENCOUNTER — APPOINTMENT (OUTPATIENT)
Dept: PHYSICAL THERAPY | Facility: CLINIC | Age: 70
End: 2020-09-18
Payer: MEDICARE

## 2020-09-22 ENCOUNTER — HOSPITAL ENCOUNTER (OUTPATIENT)
Dept: PHYSICAL THERAPY | Facility: CLINIC | Age: 70
Setting detail: THERAPIES SERIES
Discharge: HOME | End: 2020-09-22
Payer: MEDICARE

## 2020-09-22 PROCEDURE — 97140 MANUAL THERAPY 1/> REGIONS: CPT

## 2020-09-22 PROCEDURE — 97110 THERAPEUTIC EXERCISES: CPT

## 2020-09-22 NOTE — PROGRESS NOTES
[] [address]  [address]  [phone]  F: [phone] [] [address]  [place] [address]   [address]  P: [phone]  F: [phone] [x] [place]  [address] [facility]  P: [phone]  F: [phone] [] [address]  [address] [facility]   [address]   [place]: [phone]  F: [phone]      Physical Therapy Progress Note    Date: 2020      Patient: Gabrielle Rodriguez  : 1950  MRN: [id number]    Physician: April Matos                            Insurance: Medicare  Medical Diagnosis: R26.89- imbalance                     Rehab Codes: R26.89  Onset Date: 2020- fall                                 Next 's appt: TBD- pending success with PT services   Visit# / total visits: , Progress note for Medicare patient due at visit 10                                   Cancels/No Shows: 0/0    Subjective:    Pain:  [x]? Yes  []? No   Location: L hip GT bursa [in past]; L buttock [at present]      Pain Rating: (0-10 scale) 3/10  Pain altered Tx:  [x]? No  []? Yes  Action: N/A  Comments: Reports sig improvements since last land visit- and improved tolerance to aquatic arthritis class as of recent. Requests to continue PT services at [facility] clinic, versus transition to [facility].      Reports difficulty ambulating post last land visit. However, good compliance, tolerance to new, updated HEP handout. Assessment:  Pt presents with sig dec pain, and reports improved tolerance to most recent aquatic session and aquatic arthritis class. Therefore, progressed to performing WB interventions this date- denies inc pain, however, demos compensations and quick fatigue. Proceeded with previous mat table interventions- no resistance this date as continues to demo fatigue. Finished with goal update and re-assessment of LEFS- meets or demos progress toward majority of therapy goals. Rec continued aquatic/land combo treatments for remainder of PT POC in order to focus upon progressed L hip strengthening and dynamic balance re-training- verbalized understanding to all. Requests to continue PT services at [facility]- will transfer care to new PT. Consider ideas above and below next visit. STG: (to be met in 6 treatments)  1. ? Pain: Patient will report < 6/10 pain with active movement in order to improve QOL.- MET   2. ? ROM: Will demo dec trunk de-rotation and compensations with < 6/10 pain during L britney's test in order to indicate inc L ITB/TFL flexibility. Will perform all L ITB/TFL/piriformis stretches through inc ROM, with dec difficulty.- NOT YET ASSESSED  3. ? Strength: Will demo 4/5 gross L LE strength with < 3/10 P in order to better match R.- MET- also denies inc P, compared with initial evaluation   4. ? Function: Will demo > 12 reps 30\" sit<>stand and with < 3/10 B knee pain in order to indicate dec fall risk at home. - MET- < 3/10 R knee pain   5. Patient to be independent with home exercise program as demonstrated by performance with correct form without cues. - MET  6. Demonstrate Knowledge of fall prevention- MET  LTG: (to be met in 12 treatments)  1.  Will demo GOOD to FAIR conditions 3-4 mCTSIB in order to indicate improved static standing balance, with dec fall risk at home.- NOT YET ASSESSED    2. Patient will report decreased TTP to L hip GT, bursa, ITB in order to indicate decreased inflammation and improved healing of injured site.- NOT MET- reports inc TTP of all body areas, not just L ITB etc.    3. Will stand, ambulate, negotiate stairs for 10-15 min each with < 3/10 pain and dec UE A in order to improve QOL, return to fitness.- PROGRESSING- reports cooking x10 minutes without inc pain, however, grocery shopping with support of cart with B UEs; reports inc fatigue with visiting [facility] nursery over weekend   4. Improve LEFS to 23/80- PROGRESSING- 35/80     Patient goals: \"get better balance. \"- PROGRESSING- denies falls with PT services thus far     Treatment Plan:  [x] Therapeutic Exercise   87595  [] Iontophoresis: 4 mg/mL Dexamethasone Sodium Phosphate  mAmin  11762   [] Therapeutic Activity  70242 [] Vasopneumatic cold with compression  54513    [] Gait Training   49934 [] Ultrasound   18505   [x] Neuromuscular Re-education  64361 [] Electrical Stimulation Unattended  11300   [x] Manual Therapy  01286 [] Electrical Stimulation Attended  11744   [x] Instruction in HEP  [] Lumbar/Cervical Traction  43718   [x] Aquatic Therapy   62283 [x] Cold/hotpack    [] Massage   94515      [] Dry Needling, 1 or 2 muscles  13960   [] Biofeedback, first 15 minutes   95083  [] Biofeedback, additional 15 minutes   99400 [] Dry Needling, 3 or more muscles  89960     Patient Status:     [x] Continue per initial plan of care. [] Additional visits necessary. [] Other:     Requested Frequency/Duration: 2 times per week for 5 treatments. Electronically signed by Lieutenant Neha PT on 9/22/2020 at 12:19 PM    If you have any questions or concerns, please don't hesitate to call.   Thank you for your referral.

## 2020-09-22 NOTE — FLOWSHEET NOTE
[] [facility]) - [facility] &  Therapy  [address]  [phone]  F: [phone] [] [address]  [facility]   [address]  P: [phone]  F: [phone] [x] [address] &  Therapy  [address] [facility] [address]  P: [phone]  F: [phone] [] [address]  [facility]   [address]   [place]: [phone]  F: [phone]      Physical Therapy Daily Treatment Note    Date:  2020  Patient Name:  Maxim Sharma    :  1950  MRN: [id number]  Physician: April Matos                            Insurance: Medicare  Medical Diagnosis: R26.89- imbalance                     Rehab Codes: R26.89  Onset Date: 2020- fall                                 Next 's appt: TBD- pending success with PT services   Visit# / total visits: , Progress note for Medicare patient due at visit 10                                   Cancels/No Shows: 0/0    Subjective:    Pain:  [x] Yes  [] No Location: L hip GT bursa [in past]; L buttock [at present] Pain Rating: (0-10 scale) 3/10  Pain altered Tx:  [x] No  [] Yes  Action: N/A  Comments: Reports sig improvements since last land visit- and improved tolerance to aquatic arthritis class as of recent. Requests to continue PT services at [facility], versus transition to [place]. Reports difficulty ambulating post last land visit. However, good compliance, tolerance to new, updated HEP handout. Todays Treatment:  Modalities: x1 aquatic, x1 land combo treatments for L hip GT bursitis and imbalance; FALL RISK- x1/2 isolated falls in 2020  Exercises: incorporate balance re-training; plans to begin weight management to work toward R TKA  Exercise Reps/ Time Weight/ Level Comments   Supine L piriformis str. - ER/IR- modified/to tolerance  X15\" ea   Towel to Manual Therapy 13 1   []  Ther Activities     []  Aquatics     []  Vasocompression     []  Other     Total Treatment time 53 4     Medicare- $408. 11- updated 9/22/2020    [x7' on nu-step]    Assessment: [x] Progressing toward goals. Pt presents with sig dec pain, and reports improved tolerance to most recent aquatic session and aquatic arthritis class. Therefore, progressed to performing WB interventions this date- denies inc pain, however, demos compensations and quick fatigue. Proceeded with previous mat table interventions- no resistance this date as continues to demo fatigue. Finished with goal update and re-assessment of LEFS- meets or demos progress toward majority of therapy goals. Rec continued aquatic/land combo treatments for remainder of PT POC in order to focus upon progressed L hip strengthening and dynamic balance re-training- verbalized understanding to all. Requests to continue PT services at [facility]- will transfer care to new PT. Consider ideas above and below next visit. [] No change. [] Other:  [x] Patient would continue to benefit from skilled physical therapy services in order to: inc L ITB/TFL/piriformis flexibility; L LE gross strength; and overall sit<>stand, standing, ambulation and stair negotiation tolerance. STG: (to be met in 6 treatments)  1. ? Pain: Patient will report < 6/10 pain with active movement in order to improve QOL.- MET   2. ? ROM: Will demo dec trunk de-rotation and compensations with < 6/10 pain during L britney's test in order to indicate inc L ITB/TFL flexibility. Will perform all L ITB/TFL/piriformis stretches through inc ROM, with dec difficulty.- NOT YET ASSESSED  3. ? Strength: Will demo 4/5 gross L LE strength with < 3/10 P in order to better match R.- MET- also denies inc P, compared with initial evaluation   4. ? Function: Will demo > 12 reps 30\" sit<>stand and with < 3/10 B knee pain in order to indicate dec fall risk at home. - MET- < 3/10 R knee pain   5. Patient to be independent with home exercise program as demonstrated by performance with correct form without cues. - MET  6. Demonstrate Knowledge of fall prevention- MET  LTG: (to be met in 12 treatments)  1. Will demo GOOD to FAIR conditions 3-4 mCTSIB in order to indicate improved static standing balance, with dec fall risk at home.- NOT YET ASSESSED    2. Patient will report decreased TTP to L hip GT, bursa, ITB in order to indicate decreased inflammation and improved healing of injured site.- NOT MET- reports inc TTP of all body areas, not just L ITB etc.    3. Will stand, ambulate, negotiate stairs for 10-15 min each with < 3/10 pain and dec UE A in order to improve QOL, return to fitness.- PROGRESSING- reports cooking x10 minutes without inc pain, however, grocery shopping with support of cart with B UEs; reports inc fatigue with visiting [facility] nursery over weekend   4. Improve LEFS to 23/80- PROGRESSING- 35/80     Patient goals: \"get better balance. \"- PROGRESSING- denies falls with PT services thus far     Pt. Education:  [x] Yes  [] No  [] Reviewed Prior HEP/Ed  Method of Education: [x] Verbal  [] Demo  [] Written  Comprehension of Education:  [x] Verbalizes understanding. [] Demonstrates understanding. [] Needs review. [] Demonstrates/verbalizes HEP/Ed previously given. 9/14/2020- Issued all above labeled as HEP in grid for home- verbalized understanding to all. Issued new, updated HEP handout. 9/22/2020- Rec continued aquatic/land combo treatments for remainder of PT POC in order to focus upon progressed L hip strengthening and dynamic balance re-training- verbalized understanding to all. Requests to continue PT services at [facility]- will transfer care to new PT. Plan: [x] Continue current frequency toward long and short term goals. [x] Specific Instructions for subsequent treatments: Add resistance to S/L clams, reverse clams next land visit. Transfer care to new PT.       Time

## 2020-09-24 ENCOUNTER — HOSPITAL ENCOUNTER (OUTPATIENT)
Dept: PHYSICAL THERAPY | Facility: CLINIC | Age: 70
Setting detail: THERAPIES SERIES
Discharge: HOME | End: 2020-09-24
Payer: MEDICARE

## 2020-09-24 PROCEDURE — 97113 AQUATIC THERAPY/EXERCISES: CPT

## 2020-09-24 NOTE — FLOWSHEET NOTE
[] [address] Outpt       Physical Therapy MOB2       [address]        [address]       Phone: [phone]       Fax: [phone] [] [facility]       Promotion at [address] [facility]       Phone: [phone]       Fax: [phone] [x] [place]. [address] [facility] for [facility] Promotion  [address] [facility]   Phone: [phone]   Fax:  [phone]     Physical Therapy Daily  Aquatic Treatment Note    Date:  2020  Patient Name:  Joleen Caicedo    :  1950  MRN: [id number]  Physician: April Duran Diagnosis: R26.89- imbalance                     Rehab Codes: R26.89  Onset Date: 2020- fall                                 Next 's appt: TBD- pending success with PT services  Visit# / total visits: , Progress note for Medicare patient due at visit 10                                   Cancels/No Shows: 0/0    Subjective:    Pain:  [x] Yes  [] No Location: L buttock   Pain Rating: (0-10 scale) 4/10  Pain altered Tx:  [x] No  [] Yes  Action:  Comments: States having pain in the L hip today. Feels like she is getting better.       Objective:     KEY  B = Belt G = Gloves N = Noodle   C = Cuffs K = Kickboard P = Paddles   CC = Cervical Collar L = Laps T = Theratube   DB = Dumbells M = Minutes W = Weights     Exercises/Activities  Warm-up/Amb  Dynamic Exercises    Forward 4L 4L March  4L   Sideways 4L 4L Squat     Backwards 4L 4L Retro HS curls        Retro SLR     Stretches   Braiding     Gastroc/Soleus 3x30\" 3x30\" Heel to Toe amb     Hamstring 3x30\" 3x30\" Toe amb     Hip flexor   Heel amb     Piriformis        SKTC        Pec Stretch        Post Deltoid   Static Exercises UE  Gloves      Shoulder flex/ext 15 15   Static Exercises LE   Shoulder abd/add 15 15   Heel/toe raises 15 15 Shoulder H.  abd/add 15 15   March 15 15 Shoulder IR/ER cues.- MET  6. Demonstrate Knowledge of fall prevention- MET  LTG: (to be met in 12 treatments)  1. Will demo GOOD to FAIR conditions 3-4 mCTSIB in order to indicate improved static standing balance, with dec fall risk at home.- NOT YET ASSESSED    2. Patient will report decreased TTP to L hip GT, bursa, ITB in order to indicate decreased inflammation and improved healing of injured site.- NOT MET- reports inc TTP of all body areas, not just L ITB etc.    3. Will stand, ambulate, negotiate stairs for 10-15 min each with < 3/10 pain and dec UE A in order to improve QOL, return to fitness.- PROGRESSING- reports cooking x10 minutes without inc pain, however, grocery shopping with support of cart with B UEs; reports inc fatigue with visiting [facility] nursery over weekend   4. Improve LEFS to 23/80- PROGRESSING- 35/80     Patient goals: \"get better balance. \"- PROGRESSING- denies falls with PT services thus far     Pt. Education:  [x] Yes  [] No  [] Reviewed Prior HEP/Ed  Method of Education: [x] Verbal  [x] Demo  [] Written  Comprehension of Education:  [x] Verbalizes understanding. [] Demonstrates understanding. [] Needs review. [] Demonstrates/verbalizes HEP/Ed previously given. Plan: [x] Continue per plan of care.    [] Other:      Time In: 11:00 am            Time Out: 11:50 am    Electronically signed by:  Sean Looney PTA

## 2020-09-29 ENCOUNTER — HOSPITAL ENCOUNTER (OUTPATIENT)
Dept: PHYSICAL THERAPY | Facility: CLINIC | Age: 70
Setting detail: THERAPIES SERIES
Discharge: HOME | End: 2020-09-29
Payer: MEDICARE

## 2020-09-29 NOTE — FLOWSHEET NOTE
[] [address]  [address]    [phone]  F: [phone]   [] [address] [facility] [address]   [address]  P: [phone]  F: [phone]  [x] [place]  [address] [facility]  P: [phone]  F: [phone]  [] [address]  [facility]   [address]   [place]: [phone]  F: [phone]   [] [place] [address] [facility] [address]  [place]: [phone]   F: [phone]     Physical Therapy Cancel/No Show note    Date: 2020  Patient: Lubna Crowe  : 1950  MRN: [id number]    Cancels/No Shows to date:     For today's appointment patient:    []  Cancelled    [] Rescheduled appointment    [x] No-show     Reason given by patient:    []  Patient ill    []  Conflicting appointment    [] No transportation      [] Conflict with work    [] No reason given    [] Weather related    [] COVID-19    [x] Other:      Comments: Spoke with pt who thought appointment time was 11AM. Discussed potential D/C post next aquatic visit secondary to meeting majority of therapy goals, as well as upcoming R TKA. Educated pt regarding current, remaining Medicare dollars for PT services, but also, PT services are warranted for both her current condition and also upcoming R TKA d/t medical necessity. Pt to inform PTA of decision to discharge or continue with PT services at next visit.        [x] Next appointment was confirmed    Electronically signed by: Seth Ventura, PT

## 2020-10-01 ENCOUNTER — HOSPITAL ENCOUNTER (OUTPATIENT)
Dept: PHYSICAL THERAPY | Facility: CLINIC | Age: 70
Setting detail: THERAPIES SERIES
Discharge: HOME | End: 2020-10-01
Payer: MEDICARE

## 2020-10-01 PROCEDURE — 97113 AQUATIC THERAPY/EXERCISES: CPT

## 2020-10-06 ENCOUNTER — HOSPITAL ENCOUNTER (OUTPATIENT)
Dept: PHYSICAL THERAPY | Facility: CLINIC | Age: 70
Setting detail: THERAPIES SERIES
Discharge: HOME | End: 2020-10-06
Payer: MEDICARE

## 2020-10-06 PROCEDURE — 97140 MANUAL THERAPY 1/> REGIONS: CPT

## 2020-10-06 PROCEDURE — 97110 THERAPEUTIC EXERCISES: CPT

## 2020-10-06 NOTE — FLOWSHEET NOTE
[] [facility]) - [facility] &  Therapy  [address]  [phone]  F: [phone] [] [address]  [place] [address]   [address]  P: [phone]  F: [phone] [x] [address] &  Therapy  [address] [facility]  P: [phone]  F: [phone] [] [address]  [facility]   [address]   [place]: [phone]  F: [phone]      Physical Therapy Daily Treatment Note    Date:  10/6/2020  Patient Name:  Yasmany Gasca    :  1950  MRN: [id number]  Physician: April Matos                            Insurance: Medicare  Medical Diagnosis: R26.89- imbalance                     Rehab Codes: R26.89  Onset Date: 2020- fall                                 Next 's appt: TBD- pending success with PT services   Visit# / total visits: 10/12, Progress note for Medicare patient due at visit 10                                   Cancels/No Shows: 0/0    Subjective:    Pain:  [x] Yes  [] No Location: L hip GT bursa [in past]; L buttock [at present] Pain Rating: (0-10 scale) 2/10  Pain altered Tx:  [x] No  [] Yes  Action: N/A  Comments: Pt reports her [employer] aquatic classes continue to be helping. Reports difficulty ambulating post last land visit. However, good compliance, tolerance to new, updated HEP handout. Todays Treatment:  Modalities: x1 aquatic, x1 land combo treatments for L hip GT bursitis and imbalance; FALL RISK- x1/2 isolated falls in 2020  Exercises: incorporate balance re-training; plans to begin weight management to work toward R TKA  Exercise Reps/ Time Weight/ Level Comments    Supine L piriformis str. - ER/IR- modified/to tolerance  X15\" ea   Towel to support R lower leg- for dec tenderness   HEP- reviewed  To 30\"- 2020- x4- ER/IR  Towel A for IR- improved ROM compared with eval Supine SKTC str. With towel A X15\" ea   HEP- reviewed   To 30\"- 9/8/2020- x4    S/L clam X20 ea Orange HEP- reviewed  B- instructed to perform through dec ROM, no breaks  x   Reverse clam X20 Orange  B  New- 9/8/2020  HEP- 9/14/2020- reviewed  x   Supine bridge  2x10, 3\" [place]  New- 9/8/2020  Break between sets   HEP- 9/14/2020- reviewed   Added tband for ABD iso  x   Supine L ITB str. With belt     HEP- reviewed    Education     HOLD- standing L ITB stretching, traditional figure four stretch secondary to sig difficulty- may progress to in future; also aquatic PT services- rationale, expectations; precautions/contraindications; x1 aquatic, x1 land combo treatments- verbalized understanding to all     Issue fall prevention handouts     COMPLETED- 9/8/2020    SciFit x8' L1.5 B UE/LE  Instructed to perform through tolerable ROM, speed  x   Gastroc wedge str. 3x30\" ea med on MOD wedge  B LEs  B UE A  Cues  x   HS str. 2x30\" ea Stool B LEs  B UE A  Cues x   LAT AMB  RETRO AMB x2 laps  Partial squat  No UE A  Large step-length of B LEs with each  x   AMB over 6\" hurdles  x2 laps ea  forawrd and lateral   Dynamic balance, SBA to HHA as needed x   8\" step taps  10x ea LE  Dynamic balance  x   3-way hip x10 ea AROM In L LE stance  Fingertip A  CUES x   Gait training x2 laps    See subjective above; completed- 9/14/2020    HEP education   SEE BELOW- 9/14/2020    Goal update   COMPLETED- 9/22/2020    LEFS   COMPLETED- 9/22/2020    PT POC education    COMPLETED- 9/22/2020                      Other: BOLD is completed. See below for goal update. LEFS is 35/80    Foam roller to L ITB/TFL/piriformis/glutes with 1/2 black FR and MOD OP- reports relief- x10' total     Specific Instructions for next treatment: Add 3 way hip to HEP     x1 aquatic, x1 land combo treatments for L hip GT bursitis and imbalance starting next visit.      Treatment Charges: Mins Units   []  Modalities     [x]  Ther Exercise 30 2   [x]  Manual Therapy 10 1   []  Ther Activities     []  Aquatics     []  Vasocompression     []  Other     Total Treatment time 40 3     Medicare- $408. 11- updated 9/22/2020    [x8' on nu-step]    Assessment: [x] Progressing toward goals. Pt able to increase duration on Scifit to 8' with good tolerance. PTA cont with manual as listed above, pt reports mild ttp over L greater trochanter region. Able to add resistance with clamshells and reverse clamshells for B hip strengthening with good tolerance; mod muscle fatigue reported after. Progressed with dynamic balance exercises with fair good tolerance, pt req HHA for  [place] stepping over 6\" hurdles due to balance and fear of tripping. Pt reports feeling good after therapeutic interventions this date. [] No change. [] Other:  [x] Patient would continue to benefit from skilled physical therapy services in order to: inc L ITB/TFL/piriformis flexibility; L LE gross strength; and overall sit<>stand, standing, ambulation and stair negotiation tolerance. STG: (to be met in 6 treatments)  1. ? Pain: Patient will report < 6/10 pain with active movement in order to improve QOL.- MET   2. ? ROM: Will demo dec trunk de-rotation and compensations with < 6/10 pain during L britney's test in order to indicate inc L ITB/TFL flexibility. Will perform all L ITB/TFL/piriformis stretches through inc ROM, with dec difficulty.- NOT YET ASSESSED  3. ? Strength: Will demo 4/5 gross L LE strength with < 3/10 P in order to better match R.- MET- also denies inc P, compared with initial evaluation   4. ? Function: Will demo > 12 reps 30\" sit<>stand and with < 3/10 B knee pain in order to indicate dec fall risk at home. - MET- < 3/10 R knee pain   5. Patient to be independent with home exercise program as demonstrated by performance with correct form without cues. - MET  6. Demonstrate Knowledge of fall prevention- MET  LTG: (to be met in 12 treatments)  1.  Will demo GOOD to FAIR conditions 3-4 mCTSIB in order to indicate improved static standing balance, with dec fall risk at home.- NOT YET ASSESSED    2. Patient will report decreased TTP to L hip GT, bursa, ITB in order to indicate decreased inflammation and improved healing of injured site.- NOT MET- reports inc TTP of all body areas, not just L ITB etc.    3. Will stand, ambulate, negotiate stairs for 10-15 min each with < 3/10 pain and dec UE A in order to improve QOL, return to fitness.- PROGRESSING- reports cooking x10 minutes without inc pain, however, grocery shopping with support of cart with B UEs; reports inc fatigue with visiting [facility] nursery over weekend   4. Improve LEFS to 23/80- PROGRESSING- 35/80     Patient goals: \"get better balance. \"- PROGRESSING- denies falls with PT services thus far     Pt. Education:  [x] Yes  [] No  [] Reviewed Prior HEP/Ed  Method of Education: [x] Verbal  [] Demo  [] Written  Comprehension of Education:  [x] Verbalizes understanding. [] Demonstrates understanding. [] Needs review. [] Demonstrates/verbalizes HEP/Ed previously given. 9/14/2020- Issued all above labeled as HEP in grid for home- verbalized understanding to all. Issued new, updated HEP handout. 9/22/2020- Rec continued aquatic/land combo treatments for remainder of PT POC in order to focus upon progressed L hip strengthening and dynamic balance re-training- verbalized understanding to all. Requests to continue PT services at [facility]- will transfer care to new PT. Plan: [x] Continue current frequency toward long and short term goals. [x] Specific Instructions for subsequent treatments: Add resistance to S/L clams, reverse clams next land visit. Transfer care to new PT.       Time In: 9:55am        Time Out: 10:45am    Electronically signed by:  Anthony Last PTA

## 2020-10-08 ENCOUNTER — HOSPITAL ENCOUNTER (OUTPATIENT)
Dept: PHYSICAL THERAPY | Facility: CLINIC | Age: 70
Setting detail: THERAPIES SERIES
Discharge: HOME | End: 2020-10-08
Payer: MEDICARE

## 2020-10-08 PROCEDURE — 97113 AQUATIC THERAPY/EXERCISES: CPT

## 2020-10-08 NOTE — FLOWSHEET NOTE
[] [facility]       [facility]       [address] [place] [address]        [address]       Phone: [phone]       Fax: [phone] [] [facility]       Promotion at [address] [facility]       Phone: [phone]       Fax: [phone] [x] [employer]  [facility]  [address] [facility]   Phone: [phone]   Fax:  [phone]     Physical Therapy Daily  Aquatic Treatment Note    Date:  10/8/2020  Patient Name:  Lewis Verma    :  1950  MRN: [id number]  Physician: April Duran Diagnosis: R26.89- imbalance                     Rehab Codes: R26.89  Onset Date: 2020- fall                                 Next 's appt: TBD- pending success with PT services  Visit# / total visits:                             Cancels/No Shows: 0/0    Subjective:    Pain:  [x] Yes  [] No Location: L buttock   Pain Rating: (0-10 scale) 0/10  Pain altered Tx:  [x] No  [] Yes  Action:  Comments: Reports no pain at arrival.      Objective:     KEY  B = Belt G = Gloves N = Noodle   C = Cuffs K = Kickboard P = Paddles   CC = Cervical Collar L = Laps T = Theratube   DB = Dumbells M = Minutes W = Weights     Exercises/Activities  Warm-up/Amb 9/24 10/1 10/8 Dynamic Exercises 9/24 10/1 10/8   Forward 4L 4L 4L March 4L 4L 4L   Sideways 4L 4L 4L Squat  3L 4L   Backwards 4L 4L 4L Retro HS curls   4L       Retro SLR      Stretches    Braiding      Gastroc/Soleus 3x30\" 3x30\" 3x30\" Heel to Toe amb      Hamstring 3x30\" 3x30\" 3x30\" Toe amb      Hip flexor    Heel amb      Piriformis          SKTC          Pec Stretch          Post Deltoid    Static Exercises UE G Gloves Gloves       Shoulder flex/ext 15 20 20   Static Exercises LE    Shoulder abd/add 15 20 20   Heel/toe raises 15 20 20 Shoulder H.  abd/add 15 20 20   March 15 20 20 Shoulder IR/ER      Mini-squats 15 20 20 Rowing      4-way hip  15 20 20 Arm Circles - fwd/back 15 20 20   Hamstring curls 15 20 20 UT shrugs/rolls      Hip Circles/Fig 8    Scap squeezes      Ankle ROM    Diagonals 1/2      Lunges  15 B 20 B 20 B Elbow flex/ext          Pron/Sup      Functional Exercise    Wrist AROM      Step 15 fwd 20 fwd 20 fwd       Wall Push-ups    Deep H20/      SLS    Bike 5m 5m 5m   Breast Stroke on Noodle    Hip abd/add 5m 3m 3m   Noodle Twist    Hip flex/ext      Noodle Push down  20 20 Hip IR/ER      Kickboard push/pull  20 20 Knee flex/ext 3m 3m 3m       Push/pull on Rail          Hang 5m 5m 5m   Other:    Specific Instructions for next treatment:    Treatment Charges: Mins Units   []  Modalities     []  Ther Exercise     []  Manual Therapy     []  Ther Activities     [x]  Aquatics 30 2   []  Other       Medicare- $467.51- updated 10/8/2020    Assessment: [x] Progressing toward goals. Progressed dynamic ambulation to include retro HS curls to challenge balance. Cues needed throughout session for ex recall but pt demonstrates proper technique. Able to complete all ex with minimal occasional UE support for balance. Pt reports feeling \"stretched out\" post tx.    [] No change. [] Other:     STG: (to be met in 6 treatments)  1. ? Pain: Patient will report < 6/10 pain with active movement in order to improve QOL.- MET   2. ? ROM: Will demo dec trunk de-rotation and compensations with < 6/10 pain during L britney's test in order to indicate inc L ITB/TFL flexibility. Will perform all L ITB/TFL/piriformis stretches through inc ROM, with dec difficulty.- NOT YET ASSESSED  3. ? Strength: Will demo 4/5 gross L LE strength with < 3/10 P in order to better match R.- MET- also denies inc P, compared with initial evaluation   4. ? Function: Will demo > 12 reps 30\" sit<>stand and with < 3/10 B knee pain in order to indicate dec fall risk at home. - MET- < 3/10 R knee pain   5.  Patient to be independent with home exercise program as demonstrated by performance with correct form without cues. - MET  6. Demonstrate Knowledge of fall prevention- MET  LTG: (to be met in 12 treatments)  1. Will demo GOOD to FAIR conditions 3-4 mCTSIB in order to indicate improved static standing balance, with dec fall risk at home.- NOT YET ASSESSED    2. Patient will report decreased TTP to L hip GT, bursa, ITB in order to indicate decreased inflammation and improved healing of injured site.- NOT MET- reports inc TTP of all body areas, not just L ITB etc.    3. Will stand, ambulate, negotiate stairs for 10-15 min each with < 3/10 pain and dec UE A in order to improve QOL, return to fitness.- PROGRESSING- reports cooking x10 minutes without inc pain, however, grocery shopping with support of cart with B UEs; reports inc fatigue with visiting [facility] nursery over weekend   4. Improve LEFS to 23/80- PROGRESSING- 35/80     Patient goals: \"get better balance. \"- PROGRESSING- denies falls with PT services thus far     Pt. Education:  [x] Yes  [] No  [] Reviewed Prior HEP/Ed  Method of Education: [x] Verbal  [] Demo  [] Written  Comprehension of Education:  [x] Verbalizes understanding. [] Demonstrates understanding. [] Needs review. [] Demonstrates/verbalizes HEP/Ed previously given. Plan: [x] Continue per plan of care.    [] Other:      Time In: 10:00 am            Time Out: 10:50 am    Electronically signed by:  Yanci Abad PTA

## 2020-10-13 ENCOUNTER — HOSPITAL ENCOUNTER (OUTPATIENT)
Dept: PHYSICAL THERAPY | Facility: CLINIC | Age: 70
Setting detail: THERAPIES SERIES
Discharge: HOME | End: 2020-10-13
Payer: MEDICARE

## 2020-10-13 PROCEDURE — 97110 THERAPEUTIC EXERCISES: CPT

## 2020-10-13 NOTE — FLOWSHEET NOTE
[] [facility]) - [facility] &  Therapy  [address]  [phone]  F: [phone] [] [address]  [facility]   [address]  P: [phone]  F: [phone] [x] [address] &  Therapy  [address] [facility]  P: [phone]  F: [phone] [] [address]  [facility]   [address]   [place]: [phone]  F: [phone]      Physical Therapy Daily Treatment Note    Date:  10/13/2020  Patient Name:  Dayami Anguiano    :  1950  MRN: [id number]  Physician: April Matos                            Insurance: Medicare  Medical Diagnosis: R26.89- imbalance                     Rehab Codes: R26.89  Onset Date: 2020- fall                                 Next 's appt: TBD- pending success with PT services   Visit# / total visits: , Progress note for Medicare patient due at visit 10                                   Cancels/No Shows: 0/0    Subjective:    Pain:  [x] Yes  [] No Location: L hip GT bursa [in past]; L buttock [at present] Pain Rating: (0-10 scale) 2/10  Pain altered Tx:  [x] No  [] Yes  Action: N/A  Comments: Pt reports slight incr in pain since last visit possibly due to picking up/holding onto her grandkids or a long car ride to [facility] over the weekend; pain has resolved since the weekend. Reports difficulty ambulating post last land visit. However, good compliance, tolerance to new, updated HEP handout. Todays Treatment:  Modalities: x1 aquatic, x1 land combo treatments for L hip GT bursitis and imbalance; FALL RISK- x1/2 isolated falls in 2020  Exercises: incorporate balance re-training; plans to begin weight management to work toward R TKA  Exercise Reps/ Time Weight/ Level Comments    Supine L piriformis str. - ER/IR- modified/to tolerance  X15\" ea   Towel to support R lower leg- for dec tenderness   HEP- reviewed  To 30\"- 9/8/2020- x4- ER/IR  Towel A for IR- improved ROM compared with eval     Supine SKTC str. With towel A X15\" ea   HEP- reviewed   To 30\"- 9/8/2020- x4    S/L clam X20 ea Orange HEP- reviewed  B- instructed to perform through dec ROM, no breaks  x   Reverse clam X20 Orange  B  New- 9/8/2020  HEP- 9/14/2020- reviewed  x   Supine bridge  2x10, 3\" [place]  New- 9/8/2020  Break between sets   HEP- 9/14/2020- reviewed   Added tband for ABD iso  x   Supine L ITB str. With belt     HEP- reviewed    Education     HOLD- standing L ITB stretching, traditional figure four stretch secondary to sig difficulty- may progress to in future; also aquatic PT services- rationale, expectations; precautions/contraindications; x1 aquatic, x1 land combo treatments- verbalized understanding to all     Issue fall prevention handouts     COMPLETED- 9/8/2020    SciFit x8' L1.5 B UE/LE  Instructed to perform through tolerable ROM, speed  x   Gastroc wedge str. 3x30\" ea med on MOD wedge  B LEs  B UE A  Cues  x   HS str. 2x30\" ea Stool B LEs  B UE A  Cues x   LAT AMB  RETRO AMB x3 laps  Partial squat  No UE A  Large step-length of B LEs with each  x   AMB over 6\" hurdles  x2 laps ea  forawrd and lateral   Dynamic balance, SBA to HHA as needed x   8\" step taps  10x ea LE  Dynamic balance  -   3-way hip 2x10 ea AROM In L LE stance  Fingertip A  CUES x   Gait training x2 laps    See subjective above; completed- 9/14/2020    HEP education   SEE BELOW- 9/14/2020    Goal update   COMPLETED- 9/22/2020    LEFS 43/80  COMPLETED- 10/13/20 x   PT POC education    COMPLETED- 9/22/2020                      Other: BOLD is completed. See below for goal update. LEFS is 43/80      Specific Instructions for next treatment:     x1 aquatic, x1 land combo treatments for L hip GT bursitis and imbalance starting next visit.      Treatment Charges: Mins Units   []  Modalities     [x]  Ther Exercise 40 3   [] areas, not just L ITB etc.    3. Will stand, ambulate, negotiate stairs for 10-15 min each with < 3/10 pain and dec UE A in order to improve QOL, return to fitness.- PROGRESSING- reports cooking x10 minutes without inc pain, however, grocery shopping with support of cart with B UEs; reports inc fatigue with visiting [facility] nursery over weekend   4. Improve LEFS to 23/80- PROGRESSING- 35/80     Patient goals: \"get better balance. \"- PROGRESSING- denies falls with PT services thus far     Pt. Education:  [x] Yes  [] No  [] Reviewed Prior HEP/Ed  Method of Education: [x] Verbal  [] Demo  [] Written  Comprehension of Education:  [x] Verbalizes understanding. [] Demonstrates understanding. [] Needs review. [x] Demonstrates/verbalizes HEP/Ed previously given. 9/14/2020- Issued all above labeled as HEP in grid for home- verbalized understanding to all. Issued new, updated HEP handout. 9/22/2020- Rec continued aquatic/land combo treatments for remainder of PT POC in order to focus upon progressed L hip strengthening and dynamic balance re-training- verbalized understanding to all. Requests to continue PT services at [facility]- will transfer care to new PT. Plan: [x] D/C this date due to improved status and most goals met.   [] Specific Instructions for subsequent treatments:       Time In: 3:00pm     Time Out: 3:48pm    Electronically signed by:  Belinda [place] PTA

## 2020-10-14 NOTE — DISCHARGE SUMMARY
follow-up with referring MD PRN. STG: (to be met in 6 treatments)  1. ? Pain: Patient will report < 6/10 pain with active movement in order to improve QOL.- MET   2. ? ROM: Will demo dec trunk de-rotation and compensations with < 6/10 pain during L britney's test in order to indicate inc L ITB/TFL flexibility. Will perform all L ITB/TFL/piriformis stretches through inc ROM, with dec difficulty.- NOT YET ASSESSED  3. ? Strength: Will demo 4/5 gross L LE strength with < 3/10 P in order to better match R.- MET- also denies inc P, compared with initial evaluation   4. ? Function: Will demo > 12 reps 30\" sit<>stand and with < 3/10 B knee pain in order to indicate dec fall risk at home. - MET- < 3/10 R knee pain   5. Patient to be independent with home exercise program as demonstrated by performance with correct form without cues. - MET  6. Demonstrate Knowledge of fall prevention- MET  LTG: (to be met in 12 treatments)  1. Will demo GOOD to FAIR conditions 3-4 mCTSIB in order to indicate improved static standing balance, with dec fall risk at home.- NOT YET ASSESSED    2. Patient will report decreased TTP to L hip GT, bursa, ITB in order to indicate decreased inflammation and improved healing of injured site.- NOT MET- reports inc TTP of all body areas, not just L ITB etc.    3. Will stand, ambulate, negotiate stairs for 10-15 min each with < 3/10 pain and dec UE A in order to improve QOL, return to fitness.- PROGRESSING- reports cooking x10 minutes without inc pain, however, grocery shopping with support of cart with B UEs; reports inc fatigue with visiting [facility] nursery over weekend   4. Improve LEFS to 23/80- MET- 43/80     Patient goals: \"get better balance. \"- PROGRESSING- denies falls with PT services thus far     Treatment to Date:  [x] Therapeutic Exercise    [x] Modalities:  [] Therapeutic Activity    [] Ultrasound  [] Electrical Stimulation  [] Gait Training     [] Massage       [] Lumbar/Cervical Traction  [x] Neuromuscular Re-education [x] Cold/hotpack [] Iontophoresis: 4 mg/mL  [x] Instruction in Home Exercise Program                     Dexamethasone Sodium  [x] Manual Therapy             Phosphate 40-80 mAmin  [x] Aquatic Therapy                   [] Vasocompression/    [] Other:             Game Ready    Discharge Status:     [x] Pt recovered from conditions. Treatment goals were met. [x] Pt received maximum benefit. No further therapy indicated at this time. [x] Pt to continue exercise/home instructions independently. [] Therapy interrupted due to:    [] Pt has 2 or more no shows/cancels, is discontinued per our policy. [] Pt has completed prescribed number of treatment sessions. [] Other:     Electronically signed by Aiyana Tariq PT on 10/14/2020 at 6:48 PM    If you have any questions or concerns, please don't hesitate to call.   Thank you for your referral.

## 2021-04-30 ENCOUNTER — HOSPITAL ENCOUNTER (OUTPATIENT)
Dept: PHYSICAL THERAPY | Facility: CLINIC | Age: 71
Setting detail: THERAPIES SERIES
Discharge: HOME | End: 2021-04-30
Payer: MEDICARE

## 2021-04-30 PROCEDURE — 97161 PT EVAL LOW COMPLEX 20 MIN: CPT

## 2021-04-30 PROCEDURE — 97016 VASOPNEUMATIC DEVICE THERAPY: CPT

## 2021-04-30 NOTE — PRE-CERTIFICATION NOTE
Medicare Cap   [] Physical Therapy  [] Speech Therapy  [] Occupational therapy  *PT and Speech caps combine      $2100 Limit for PT and Speech combined  $2100 Limit for OT individually  At the beginning of the month where you expect to go over $2100, please add the [address] [place] [address] modifier      Patient Name: Vivi Love: 1950    Note:  This is an estimate of charges billed.      Date of [place] [address] Name # units/ charge $$$ charge Daily Total Charge Ongoing Total $$$   4/30/21 Vaso+EV    92.18

## 2021-04-30 NOTE — CONSULTS
[] [facility]) [facility] &  Therapy  [address]  [phone]  F: [phone] [] [address]  [address]   [address]  P: [phone]  F: [phone] [x] [address] &  Therapy  [address] [facility]  P: [phone]  F: [phone] [] [address]  P: [phone]  F: [phone] [] [address]  [facility]   [address]   [place]: [phone]  F: [phone]      Physical Therapy Lower Extremity Evaluation    Date:  2021  Patient: Vianey Dhaliwal  : 1950  MRN: [id number]  Physician: Garrison David MD  Insurance: Medicare  Medical Diagnosis: S24.658 (ICD-10-CM) - Hx of total knee replacement, right  Rehab Codes: M62.81, M25.561, R26.2  Onset date: 21 TKR  Next Dr's appt.: 21    Subjective:   CC:Patient reports intermittent R knee pain that is described as aching and shooting. Pain is 8/10 at worst with walking, transfers, bending knee.  Pain is better with sitting and reduces to 3/10   Sleep: [] OK    [x] Disturbed     HPI: (onset date) 21 TKR sx    PMHx: [] Unremarkable [] Diabetes [x] HTN  [] Pacemaker   [] MI/Heart Problems [] Cancer [x] Arthritis [x] Other:L TKR               [x] Refer to full medical chart  In EPIC       Medications: [x] Refer to full medical record [] None [] Other:  Allergies:      [x] Refer to full medical record [] None [] Other:    Patient lives with: house   In what type of home []  One story   [x] Two story   [] Split level   Number of stairs to enter  4   With handrail on the [x]  Right to enter   [x] Left to enter   Bathroom has a []  Tub only  [] Tub/shower combo   [] Walk in shower    []  Grab bars   Washing machine is on []  Main level   [] Second level   [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    Job Status []  Normal duty   [] Light duty   [] Off due to condition    [x]  Retired   [] Not employed   [] Disability  [] Other:  []  Return to work: Work activities/duties        ADL/IADL Previous level of function Current level of function Who currently assists the patient with task   Bathing  [x] Independent  [] Assist [] Independent  [] Assist    Dress/grooming [x] Independent  [] Assist [] Independent  [x] Assist    Transfer/mobility [x] Independent  [] Assist [] Independent  [] Assist    Feeding [x] Independent  [] Assist [x] Independent  [] Assist    Toileting [x] Independent  [] Assist [] Independent  [x] Assist    Driving [x] Independent  [] Assist [] Independent  [x] Assist    Housekeeping [x] Independent  [] Assist [] Independent  [x] Assist    Grocery shop/meal prep [x] Independent  [] Assist [] Independent  [x] Assist      Gait Prior level of function Current level of function    [x] Independent  [] Assist [] Independent  [] Assist   Device: [] Independent [] Independent    [x] Straight Cane [] Quad cane [] Straight Cane [] Quad cane    [] Standard walker [] Rolling walker   [] 4 wheeled walker [] Standard walker [x] Rolling walker   [] 4 wheeled walker    [] Wheelchair [] Wheelchair       Objective:    ROM  ° A/P STRENGTH TESTS (+/-) Left Right Not Tested    Left Right Left Right Ant.  Drawer   []   Hip Flex     Post. Drawer   []   Ext     Lachmans   []   ER     Valgus Stress   []   IR     Varus Stress   []   ABD     Smiths   []   ADD     Apleys Comp.   []   Knee Flex  45   Apleys Dist.   []   Ext  5-0   Hip Scouring   []   Ankle DF     HARRISONs   []   PF     Piriformis   []   INV     Kenyas   []   EVER     Talor Tilt   []        Pat-Fem Grind   []     Quad contraction= Poor    OBSERVATION No Deficit Deficit Not Tested Comments   Posture       Forward Head [] [] []    Rounded Shoulders [] [x] []    Kyphosis [] [] []    Lordosis [] [] []    Lateral Shift [] [] []    Scoliosis [] [] []    Iliac Crest [] [] []    PSIS [] [] []    ASIS [] [] []    Genu Valgus [] [] []    Genu Varus [] [] []    Genu Recurvatum [] [] []    Pronation [] [] []    Supination [] [] []    Leg Length Discrp [] [] []    Slumped Sitting [] [x] []    Palpation [] [x] [] Around patella   Sensation [] [] []    Edema [] [] []    Neurological [] [] []    Patellar Mobility [] [x] [] Med/lat patella   Patellar Orientation [] [] []    Gait [] [x] [] Analysis:antalgic limp slow pace,  RW       FUNCTION Normal Difficult Unable   Sitting [] [x] []   Standing [] [x] []   Ambulation [] [x] []   Groom/Dress [] [x] []   Lift/Carry [] [] [x]   Stairs [] [] [x]   Bending [] [] [x]   Squat [] [] [x]   Kneel [] [] [x]       Functional Test: LEFI Score: 25/80% functionally impaired     Comments:Patient presents to physical demonstrating the above post op deficits  Assessment:  Patient would benefit from skilled physical therapy services in order to: improve mobility and reduce pain    Problems:    [x] ? Pain:  [x] ? ROM:  [] ? Strength:  [x] ? Function:  [] Other:       STG: (to be met in 9 treatments)  1. ? Pain: Patient to report 5/10 pain at worst  2. ? ROM: Patient to demonstrate 0-90 degrees knee flexion  3. ? Strength:  4. ? Function: Patient to demonstrate the ability to ambulate with a neutral tall posture   5. Patient to be independent with home exercise program as demonstrated by performance with correct form without cues. 6. Demonstrate Knowledge of fall prevention  LTG: (to be met in 18 treatments)  1. Patient to demonstrate the ability to ambulate without AD using a neutral posture  2. Patient to report 2/10 pain at worsr  3.  Patient to demonstrate 0-115 degrees knee ROM  Patient to report 70/80 on LEFI                 Patient goals: to be able to walk more upright and move without pain    Rehab Potential:  [x] Good  [] Fair  [] Poor   Suggested Professional Referral:  [x] No  [] Yes:  Barriers to Goal Achievement:  [x] No  [] Yes:  Domestic Concerns:  [x] No  [] Yes:    Pt. Education:  [x] Plans/Goals, Risks/Benefits discussed  [x] Home exercise program    Method of Education: [x] Verbal  [] Demo  [] Written  Comprehension of Education:  [] Verbalizes understanding. [] Demonstrates understanding. [] Needs Review. [] Demonstrates/verbalizes understanding of HEP/Ed previously given.     Treatment Plan:  [x] Therapeutic Exercise   07912  [] Iontophoresis: 4 mg/mL Dexamethasone Sodium Phosphate  mAmin  08205   [] Therapeutic Activity  41235 [x] Vasopneumatic cold with compression  55047    [x] Gait Training   64602 [] Ultrasound   34213   [x] Neuromuscular Re-education  96671 [] Electrical Stimulation Unattended  51243   [x] Manual Therapy  62369 [] Electrical Stimulation Attended  51563   [x] Instruction in HEP  [] Lumbar/Cervical Traction  84721   [] Aquatic Therapy   62576 [] Cold/hotpack    [] Massage   35255      [] Dry Needling, 1 or 2 muscles  71800   [] Biofeedback, first 15 minutes   45564  [] Biofeedback, additional 15 minutes   91584 [] Dry Needling, 3 or more muscles  06223       Frequency:  2-3x/week for 18 visits    Todays Treatment:  Modalities:   Precautions:  Exercises:  Exercise Reps/ Time Weight/ Level Comments   Heel slides      Q sets      Ankle pumps                  Other:    Specific Instructions for next treatment: Begin LE exercises    Evaluation Complexity:  History (Personal factors, comorbidities) [x] 0 [] 1-2 [] 3+   Exam (limitations, restrictions) [x] 1-2 [] 3 [] 4+   Clinical presentation (progression) [x] Stable [] Evolving  [] Unstable   Decision Making [x] Low [] Moderate [] High    [x] Low Complexity [] Moderate Complexity [] High Complexity       Treatment Charges: Mins Units   [x] Evaluation       [x]  Low       []  Moderate       []  High 30 1   []  Modalities     [x]  Ther Exercise 5 1   []  Manual Therapy     []  Ther Activities     []  Aquatics     [x]  Vasocompression 15 1   []  Other TOTAL TREATMENT TIME: 50    Time in: 105pm   Time Out: 200pm    Electronically signed by: Rita Guaman PT      Physician Signature:________________________________Date:__________________  By signing above or cosigning this note, I have reviewed this plan of care and certify a need for medically necessary rehabilitation services.      *PLEASE SIGN ABOVE AND FAX BACK ALL PAGES*

## 2021-05-03 ENCOUNTER — HOSPITAL ENCOUNTER (OUTPATIENT)
Dept: PHYSICAL THERAPY | Facility: CLINIC | Age: 71
Setting detail: THERAPIES SERIES
Discharge: HOME | End: 2021-05-03
Payer: MEDICARE

## 2021-05-03 PROCEDURE — 97110 THERAPEUTIC EXERCISES: CPT

## 2021-05-03 PROCEDURE — 97016 VASOPNEUMATIC DEVICE THERAPY: CPT

## 2021-05-03 NOTE — PRE-CERTIFICATION NOTE
Medicare Cap   [x] Physical Therapy  [] Speech Therapy  [] Occupational therapy  *PT and Speech caps combine      $2100 Limit for PT and Speech combined  $2100 Limit for OT individually  At the beginning of the month where you expect to go over $2100, please add the [address] [place] modifier      Patient Name: Jacki Free: 1950    Note:  This is an estimate of charges billed.      Date of [address] Name # units/ charge $$$ charge Daily Total Charge Ongoing Total $$$   4/30/21 Vaso+EV    92.18   5-3-21 TE2 vaso   62.3 154.48

## 2021-05-03 NOTE — FLOWSHEET NOTE
[] [facility]  Outpatient Rehabilitation &  Therapy  [address]  [phone]  F: [phone] [] [address]  [facility]   [address]  P: [phone]  F: [phone] [x] [address] &  Therapy  [address]  P: [phone]  F: [phone] [] [address]  P: [phone]  F: [phone] [] [address]  [facility]   [address]   [place]: [phone]  F: [phone]      Physical Therapy Daily Treatment Note    Date:  5/3/2021  Patient Name:  Clive Villela    :  1950  MRN: [id number]   Physician: Josafat Hicks MD                       Insurance: Medicare  Medical Diagnosis: S04.283 (ICD-10-CM) - Hx of total knee replacement, right  Rehab Codes: M62.81, M25.561, R26.2  Onset date: 21 TKR                   Next Dr's appt.: 21  Visit# / total visits:   Progress note for Medicare patient due at visit 10     Cancels/No Shows: 0/0    Subjective:    Pain:  [x] Yes  [] No Location: R knee Pain Rating: (0-10 scale) 6/10  Pain altered Tx:  [] No  [] Yes  Action:  Comments:Patient reports feeling better than eval day    Objective:  Modalities:vasocompression [id number] 34 degrees low compression   Precautions:  Exercises:  Exercise Reps/ Time Weight/ Level Comments   nustep 8'     Calf stretch 2'     Step up x15 4'    Step down 10 4'    Knee flexion 2x10     Sit to stand 20x     TKE x20 blue    Heel slides      SAQ 20     Quad sets 20           Other:    [] Specific Instructions for subsequent treatments: Continue with LE strengthening improving knee flexion    Treatment Charges: Mins Units   []  Modalities     [x]  Ther Exercise 35 2   []  Manual Therapy     []  Ther Activities     []  Aquatics     [x]  Vasocompression 15 1   []  Other Total Treatment time 50 3       Assessment: [] Progressing toward goals. [] No change. [x] Other: Initiated R LE exercises to improve flexion ROM and strength. VC's needed to correct exercise tech and eliminate compensations     [] Patient would continue to benefit from skilled physical therapy services in order to: improve mobility and reduce pain    STG: (to be met in 9 treatments)  1. ? Pain: Patient to report 5/10 pain at worst  2. ? ROM: Patient to demonstrate 0-90 degrees knee flexion  3. ? Strength:  4. ? Function: Patient to demonstrate the ability to ambulate with a neutral tall posture   5. Patient to be independent with home exercise program as demonstrated by performance with correct form without cues. 6. Demonstrate Knowledge of fall prevention  LTG: (to be met in 18 treatments)  1. Patient to demonstrate the ability to ambulate without AD using a neutral posture  2. Patient to report 2/10 pain at worsr  3. Patient to demonstrate 0-115 degrees knee ROM  Patient to report 70/80 on LEFI                 Patient goals: to be able to walk more upright and move without pain       Pt. Education:  [x] Yes  [] No  [] Reviewed Prior HEP/Ed  Method of Education: [] Verbal  [] Demo  [] Written  Comprehension of Education:  [] Verbalizes understanding. [] Demonstrates understanding. [] Needs review. [] Demonstrates/verbalizes HEP/Ed previously given. Plan: [x] Continue current frequency toward long and short term goals.           Time In: 430pm            Time Out: 530pm    Electronically signed by:  Rhea Sanz PT

## 2021-05-05 ENCOUNTER — HOSPITAL ENCOUNTER (OUTPATIENT)
Dept: PHYSICAL THERAPY | Facility: CLINIC | Age: 71
Setting detail: THERAPIES SERIES
Discharge: HOME | End: 2021-05-05
Payer: MEDICARE

## 2021-05-05 PROCEDURE — 97016 VASOPNEUMATIC DEVICE THERAPY: CPT

## 2021-05-05 PROCEDURE — 97110 THERAPEUTIC EXERCISES: CPT

## 2021-05-05 NOTE — PRE-CERTIFICATION NOTE
Medicare Cap   [x] Physical Therapy  [] Speech Therapy  [] Occupational therapy  *PT and Speech caps combine      $2100 Limit for PT and Speech combined  $2100 Limit for OT individually  At the beginning of the month where you expect to go over $2100, please add the [place] [place] modifier      Patient Name: Ida Pen: 1950    Note:  This is an estimate of charges billed. Date of [address] Name # units/ charge $$$ charge Daily Total Charge Ongoing Total $$$   4/30/21 Vaso+EV    92.18   5-3-21 TE2 vaso   62.3 154.48   5-5-21 TE2+vaso PTA 25.26+19.74+.  7.96 52.96 207.44

## 2021-05-07 ENCOUNTER — HOSPITAL ENCOUNTER (OUTPATIENT)
Dept: PHYSICAL THERAPY | Facility: CLINIC | Age: 71
Setting detail: THERAPIES SERIES
Discharge: HOME | End: 2021-05-07
Payer: MEDICARE

## 2021-05-07 PROCEDURE — 97110 THERAPEUTIC EXERCISES: CPT

## 2021-05-07 PROCEDURE — 97016 VASOPNEUMATIC DEVICE THERAPY: CPT

## 2021-05-07 NOTE — FLOWSHEET NOTE
[] [facility]) [facility] &  Therapy  [address]  [phone]  F: [phone] [] [address]  [address]   [address]  P: [phone]  F: [phone] [x] [address] [facility]  Outpatient Rehabilitation &  Therapy  [address]  P: [phone]  F: [phone] [] [address]  P: [phone]  F: [phone] [] [address]  [facility]   [address]   [place]: [phone]  F: [phone]      Physical Therapy Daily Treatment Note    Date:  2021  Patient Name:  Timo Dinero    :  1950  MRN: [id number]   Physician: Rivera Nevarez MD                         Insurance: Medicare  Medical Diagnosis: G19.449 (ICD-10-CM) - Hx of total knee replacement, right  Rehab Codes: M62.81, M25.561, R26.2  Onset date: 21 TKR                     Next Dr's appt.: 21  Visit# / total visits: , Progress note for Medicare patient due at visit 10     Cancels/No Shows: 0/0    Subjective:    Pain:  [x] Yes  [] No Location: R knee Pain Rating: (0-10 scale) 6-7/10  Pain altered Tx:  [] No  [] Yes  Action:  Comments: No change in pain since her previous visit. Having some trouble getting her pain meds taken in time to be help decrease pain during PT.       Objective:  Modalities:vasocompression [id number] 34 degrees low compression   Precautions:  Exercises:  Exercise Reps/ Time Weight/ Level Comments   nustep 8'     Calf stretch 2'     Seated knee flexion S 5x10\"           Step up x15 4'    Step down 10 4'    Knee flexion 2x10     Sit to stand 20x     TKE x20 blue    TGym squats 2x10 L14    HR 20x           Heel slides 15x  W/ strap   SAQ 10x     Quad sets 20x5\"     LAQ 10x  - unable d/t pain today               Other:    [] Specific Instructions for subsequent treatments: Continue with LE strengthening improving knee flexion    Treatment Charges: Mins Units   []  Modalities     [x]  Ther Exercise 40 3   []  Manual Therapy     []  Ther Activities     []  Aquatics     [x]  Vasocompression 15 1   []  Other     Total Treatment time 55 4       Assessment: [x] Progressing toward goals. Able to add tgym squats this date to improve R knee flexion with fair pt tolerance. Standing ex completed with cueing again needed to avoid circumduction with step ups, fair follow through. Increased pain at the knee cap with attempts at [address] this date, held ex. Heel slides completed manually to improve ROM. Vaso applied post ex for pain relief. [] No change. [] Other:     [] Patient would continue to benefit from skilled physical therapy services in order to: improve mobility and reduce pain    STG: (to be met in 9 treatments)  1. ? Pain: Patient to report 5/10 pain at worst  2. ? ROM: Patient to demonstrate 0-90 degrees knee flexion  3. ? Strength:  4. ? Function: Patient to demonstrate the ability to ambulate with a neutral tall posture   5. Patient to be independent with home exercise program as demonstrated by performance with correct form without cues. 6. Demonstrate Knowledge of fall prevention  LTG: (to be met in 18 treatments)  1. Patient to demonstrate the ability to ambulate without AD using a neutral posture  2. Patient to report 2/10 pain at worsr  3. Patient to demonstrate 0-115 degrees knee ROM  Patient to report 70/80 on LEFI                 Patient goals: to be able to walk more upright and move without pain       Pt. Education:  [x] Yes  [] No  [x] Reviewed Prior HEP/Ed  Method of Education: [x] Verbal  [] Demo  [] Written  Comprehension of Education:  [x] Verbalizes understanding. [x] Demonstrates understanding. [x] Needs review. [] Demonstrates/verbalizes HEP/Ed previously given. Plan: [x] Continue current frequency toward long and short term goals.           Time In: 1:00pm Time Out: 2:02pm    Electronically signed by:  Kyra Nelson PTA

## 2021-05-07 NOTE — PRE-CERTIFICATION NOTE
Medicare Cap   [x] Physical Therapy  [] Speech Therapy  [] Occupational therapy  *PT and Speech caps combine      $2100 Limit for PT and Speech combined  $2100 Limit for OT individually  At the beginning of the month where you expect to go over $2100, please add the [address] [place] modifier      Patient Name: Martina Quintanilla: 1950    Note:  This is an estimate of charges billed. Date of [address] Name # units/ charge $$$ charge Daily Total Charge Ongoing Total $$$   4/30/21 Vaso+EV    92.18   5-3-21 TE2 vaso   62.3 154.48   5-5-21 TE2+vaso PTA 25.26+19.74+.  7.96 52.96 207.44   5-7-21 TE3+vaso PTA 25.26+19.74*2+  7.96 72.70 280.14

## 2021-05-10 ENCOUNTER — HOSPITAL ENCOUNTER (OUTPATIENT)
Dept: PHYSICAL THERAPY | Facility: CLINIC | Age: 71
Setting detail: THERAPIES SERIES
Discharge: HOME | End: 2021-05-10
Payer: MEDICARE

## 2021-05-10 PROCEDURE — 97110 THERAPEUTIC EXERCISES: CPT

## 2021-05-10 PROCEDURE — 97016 VASOPNEUMATIC DEVICE THERAPY: CPT

## 2021-05-10 NOTE — FLOWSHEET NOTE
[] [facility]) - [facility] &  Therapy  [address]  [phone]  F: [phone] [] [address]  [facility]   [address]  P: [phone]  F: [phone] [x] [address] &  Therapy  [address]  P: [phone]  F: [phone] [] [address]  P: [phone]  F: [phone] [] [address]  [facility]   [address]   [place]: [phone]  F: [phone]      Physical Therapy Daily Treatment Note    Date:  5/10/2021  Patient Name:  Linsey Adrian    :  1950  MRN: [id number]   Physician: Uziel Medina MD                         Insurance: Medicare  Medical Diagnosis: S90.860 (ICD-10-CM) - Hx of total knee replacement, right  Rehab Codes: M62.81, M25.561, R26.2  Onset date: 21 TKR                     Next Dr's appt.: 21  Visit# / total visits: , Progress note for Medicare patient due at visit 10     Cancels/No Shows: 0/0    Subjective:    Pain:  [x] Yes  [] No Location: R knee Pain Rating: (0-10 scale) 4/10  Pain altered Tx:  [] No  [] Yes  Action:  Comments: pt mentioned that she has gotten off of the opioids and now using a cane.    Objective:  Modalities:vasocompression [id number] 34 degrees low compression   Precautions:  Exercises:  Exercise Reps/ Time Weight/ Level Comments    nustep 10'   x   Calf stretch 30\"x3   x   Standing knee flexion S 5x10\"   x          Step up x15 4'  x   Step down 10 4'     Marching  2x10   x   HR 2x10   x   HS curl 2x10   x   Mini squats 20x   x   TKE x20 blue     TGym squats 2x10 L14            Heel slides 15x  W/ strap    SAQ 10x      Quad sets 20x5\"      LAQ 10x  - unable d/t pain today                  Other:    [] Specific Instructions for subsequent treatments: Continue with LE strengthening improving knee flexion, Measure ROM next session    Treatment Charges: Mins Units   []  Modalities     [x]  Ther Exercise 40 3   []  Manual Therapy     []  Ther Activities     []  Aquatics     [x]  Vasocompression 15 1   []  Other     Total Treatment time 55 4       Assessment: [x] Progressing toward goals. Increased standing exercises with focus on mechanics and form. Mod cueing on circumduction reduction. Pt fatigued and was unable to complete all exercises. Min A required for end range strengthening with SAQ. Finished with vaso at end of session. [] No change. [] Other:     [] Patient would continue to benefit from skilled physical therapy services in order to: improve mobility and reduce pain    STG: (to be met in 9 treatments)  1. ? Pain: Patient to report 5/10 pain at worst  2. ? ROM: Patient to demonstrate 0-90 degrees knee flexion  3. ? Strength:  4. ? Function: Patient to demonstrate the ability to ambulate with a neutral tall posture   5. Patient to be independent with home exercise program as demonstrated by performance with correct form without cues. 6. Demonstrate Knowledge of fall prevention  LTG: (to be met in 18 treatments)  1. Patient to demonstrate the ability to ambulate without AD using a neutral posture  2. Patient to report 2/10 pain at worsr  3. Patient to demonstrate 0-115 degrees knee ROM  Patient to report 70/80 on LEFI                 Patient goals: to be able to walk more upright and move without pain       Pt. Education:  [x] Yes  [] No  [x] Reviewed Prior HEP/Ed  Method of Education: [x] Verbal  [] Demo  [] Written  Comprehension of Education:  [x] Verbalizes understanding. [x] Demonstrates understanding. [x] Needs review. [] Demonstrates/verbalizes HEP/Ed previously given. Plan: [x] Continue current frequency toward long and short term goals.           Time In: 1:00pm            Time Out: 2:04pm    Electronically signed by:  Jessica Paul PTA

## 2021-05-12 ENCOUNTER — HOSPITAL ENCOUNTER (OUTPATIENT)
Dept: PHYSICAL THERAPY | Facility: CLINIC | Age: 71
Setting detail: THERAPIES SERIES
Discharge: HOME | End: 2021-05-12
Payer: MEDICARE

## 2021-05-12 PROCEDURE — 97016 VASOPNEUMATIC DEVICE THERAPY: CPT

## 2021-05-12 PROCEDURE — 97110 THERAPEUTIC EXERCISES: CPT

## 2021-05-12 NOTE — FLOWSHEET NOTE
[] [facility]) - [facility] &  Therapy  [address]  [phone]  F: [phone] [] [address]  [address]   [address]  P: [phone]  F: [phone] [x] [address] &  Therapy  [address]  P: [phone]  F: [phone] [] [address]  P: [phone]  F: [phone] [] [address]  [facility]   [address]   [place]: [phone]  F: [phone]      Physical Therapy Daily Treatment Note    Date:  2021  Patient Name:  Alvin Dove    :  1950  MRN: [id number]   Physician: Lyubov Li MD                         Insurance: Medicare  Medical Diagnosis: J38.897 (ICD-10-CM) - Hx of total knee replacement, right  Rehab Codes: M62.81, M25.561, R26.2  Onset date: 21 TKR                     Next Dr's appt.: 21  Visit# / total visits: , Progress note for Medicare patient due at visit 10     Cancels/No Shows: 0/0    Subjective:    Pain:  [x] Yes  [] No Location: R knee Pain Rating: (0-10 scale) 4/10  Pain altered Tx:  [] No  [] Yes  Action:  Comments: Pt mentioned that she had her staples removed from her knee and that her surgeon is pleased with her process.    Objective:  Modalities:vasocompression [id number] 34 degrees low compression   Precautions:  Exercises:  Exercise Reps/ Time Weight/ Level Comments    Nustep 10'   x   Calf stretch 30\"x3   x   Standing knee flexion S 5x10\"   x          Step up/down/back x10 4' Bilateral   x   Marching  2x10   x   HR 2x10   x   HS curl 2x10   x   Mini squats 20x   x   TKE 20x3\" Gray   x   TGym squats/HR 2x10 L17  x          Heel slides 15x  W/ strap x   SAQ 10x   x   Quad sets 20x5\"   x   LAQ 20x    x                 Other: 1°-89° (5-12-21)    [] Specific Instructions for subsequent treatments: Continue with LE strengthening improving knee flexion, Measure ROM next session    Treatment Charges: Mins Units   []  Modalities     [x]  Ther Exercise 40 3   []  Manual Therapy     []  Ther Activities     []  Aquatics     [x]  Vasocompression 15 1   []  Other     Total Treatment time 55 4       Assessment: [x] Progressing toward goals. Continued working on standing exercises to help create further strength and stability. All performed in // bars. New change to steps with ascending then descending forwards then into reverse. Completed all table exercises and measurements before vaso. [] No change. [x] Other:     [] Patient would continue to benefit from skilled physical therapy services in order to: improve mobility and reduce pain    STG: (to be met in 9 treatments)  1. ? Pain: Patient to report 5/10 pain at worst  2. ? ROM: Patient to demonstrate 0-90 degrees knee flexion  3. ? Strength:  4. ? Function: Patient to demonstrate the ability to ambulate with a neutral tall posture   5. Patient to be independent with home exercise program as demonstrated by performance with correct form without cues. 6. Demonstrate Knowledge of fall prevention  LTG: (to be met in 18 treatments)  1. Patient to demonstrate the ability to ambulate without AD using a neutral posture  2. Patient to report 2/10 pain at worsr  3. Patient to demonstrate 0-115 degrees knee ROM  Patient to report 70/80 on LEFI                 Patient goals: to be able to walk more upright and move without pain       Pt. Education:  [x] Yes  [] No  [x] Reviewed Prior HEP/Ed  Method of Education: [x] Verbal  [] Demo  [] Written  Comprehension of Education:  [x] Verbalizes understanding. [x] Demonstrates understanding. [x] Needs review. [] Demonstrates/verbalizes HEP/Ed previously given. Plan: [x] Continue current frequency toward long and short term goals.           Time In: 11:00am            Time Out:

## 2021-05-14 ENCOUNTER — HOSPITAL ENCOUNTER (OUTPATIENT)
Dept: PHYSICAL THERAPY | Facility: CLINIC | Age: 71
Setting detail: THERAPIES SERIES
Discharge: HOME | End: 2021-05-14
Payer: MEDICARE

## 2021-05-14 PROCEDURE — 97110 THERAPEUTIC EXERCISES: CPT

## 2021-05-14 PROCEDURE — 97016 VASOPNEUMATIC DEVICE THERAPY: CPT

## 2021-05-14 NOTE — FLOWSHEET NOTE
[] [facility]) - [facility] &  Therapy  [address]  [phone]  F: [phone] [] [address]  [address]   [address]  P: [phone]  F: [phone] [x] [address] &  Therapy  [address]  P: [phone]  F: [phone] [] [address]  P: [phone]  F: [phone] [] [address]  [facility]   [address]   [place]: [phone]  F: [phone]      Physical Therapy Daily Treatment Note    Date:  2021  Patient Name:  Cyril Lezama    :  1950  MRN: [id number]   Physician: Adelso Keene MD                         Insurance: Medicare  Medical Diagnosis: W19.329 (ICD-10-CM) - Hx of total knee replacement, right  Rehab Codes: M62.81, M25.561, R26.2  Onset date: 21 TKR                     Next Dr's appt. : 21  Visit# / total visits: , Progress note for Medicare patient due at visit 10     Cancels/No Shows: 0/0    Subjective:    Pain:  [x] Yes  [] No Location: R knee Pain Rating: (0-10 scale) 4/10  Pain altered Tx:  [] No  [] Yes  Action:  Comments: Pt stated that she was outside doing yard work yesterday and was feeling pretty good.    Objective:  Modalities:vasocompression [id number] 34 degrees low compression   Precautions:  Exercises:  Exercise Reps/ Time Weight/ Level Comments    Nustep 10'   x   Calf stretch 30\"x3   x   Standing knee flexion S 5x10\"   x          Step up/down/back x10 4' Bilateral  x   Marching  2x10   x   HR 2x10   x   HS curl 2x10   x   Mini squats 20x   x   TKE 20x3\" Jesus      BOSU lunges 15x  Bilateral  x   TGym squats/HR 20x2\" L17  x          Heel slides 15x  W/ strap    SAQ 10x      Quad sets 20x5\"                    Other: 1°-89° (21)    [] Specific Instructions for subsequent treatments: Continue with LE strengthening improving knee flexion, Measure ROM next session    Treatment Charges: Mins Units   []  Modalities     [x]  Ther Exercise 40 3   []  Manual Therapy     []  Ther Activities     []  Aquatics     [x]  Vasocompression 15 1   []  Other     Total Treatment time 55 4       Assessment: [x] Progressing toward goals. Pt able to perform all standing exercises with more confidence and less issues with mechanics. Continued difficulty with step up and overs d/t weakness. Added in BOSU lunges to further challenge stability while working on strength. Educated pt on scar tissue massage. Pt showed skin irritation from adhesive on bandage provided after surgery. Pt had orders from doctor Finished with vaso at end.     [] No change. [x] Other:     [] Patient would continue to benefit from skilled physical therapy services in order to: improve mobility and reduce pain    STG: (to be met in 9 treatments)  1. ? Pain: Patient to report 5/10 pain at worst  2. ? ROM: Patient to demonstrate 0-90 degrees knee flexion  3. ? Strength:  4. ? Function: Patient to demonstrate the ability to ambulate with a neutral tall posture   5. Patient to be independent with home exercise program as demonstrated by performance with correct form without cues. 6. Demonstrate Knowledge of fall prevention  LTG: (to be met in 18 treatments)  1. Patient to demonstrate the ability to ambulate without AD using a neutral posture  2. Patient to report 2/10 pain at worsr  3. Patient to demonstrate 0-115 degrees knee ROM  Patient to report 70/80 on LEFI                 Patient goals: to be able to walk more upright and move without pain       Pt. Education:  [x] Yes  [] No  [x] Reviewed Prior HEP/Ed  Method of Education: [x] Verbal  [] Demo  [] Written  Comprehension of Education:  [x] Verbalizes understanding. [x] Demonstrates understanding. [x] Needs review.   [] Demonstrates/verbalizes HEP/Ed previously given.     Plan: [x] Continue current frequency toward long and short term goals.           Time In: 1:00pm            Time Out: 2:01pm    Electronically signed by:  Vaibhav Mendoza PTA

## 2021-05-18 ENCOUNTER — HOSPITAL ENCOUNTER (OUTPATIENT)
Dept: PHYSICAL THERAPY | Facility: CLINIC | Age: 71
Setting detail: THERAPIES SERIES
Discharge: HOME | End: 2021-05-18
Payer: MEDICARE

## 2021-05-18 PROCEDURE — 97110 THERAPEUTIC EXERCISES: CPT

## 2021-05-18 PROCEDURE — 97016 VASOPNEUMATIC DEVICE THERAPY: CPT

## 2021-05-18 NOTE — FLOWSHEET NOTE
(5-18-21)    [] Specific Instructions for subsequent treatments: Continue with LE strengthening improving knee flexion     Treatment Charges: Mins Units   []  Modalities     [x]  Ther Exercise 40 3   []  Manual Therapy     []  Ther Activities     []  Aquatics     [x]  Vasocompression 15 1   []  Other     Total Treatment time 55 4       Assessment: [x] Progressing toward goals. Continued working on standing exercises with the progression of increased reps. Pt demo improved gait pattern and awareness of self correction out of circumduction during ascending steps. Pt with improved knee flexion noted above. [] No change. [x] Other:     [] Patient would continue to benefit from skilled physical therapy services in order to: improve mobility and reduce pain    STG: (to be met in 9 treatments)  1. ? Pain: Patient to report 5/10 pain at worst   2. ? ROM: Patient to demonstrate 0-90 degrees knee flexion  3. ? Strength:  4. ? Function: Patient to demonstrate the ability to ambulate with a neutral tall posture   5. Patient to be independent with home exercise program as demonstrated by performance with correct form without cues. 6. Demonstrate Knowledge of fall prevention  LTG: (to be met in 18 treatments)  1. Patient to demonstrate the ability to ambulate without AD using a neutral posture  2. Patient to report 2/10 pain at worsr  3. Patient to demonstrate 0-115 degrees knee ROM  Patient to report 70/80 on LEFI                 Patient goals: to be able to walk more upright and move without pain       Pt. Education:  [x] Yes  [] No  [x] Reviewed Prior HEP/Ed  Method of Education: [x] Verbal  [] Demo  [] Written  Comprehension of Education:  [x] Verbalizes understanding. [x] Demonstrates understanding. [x] Needs review. [] Demonstrates/verbalizes HEP/Ed previously given. Plan: [x] Continue current frequency toward long and short term goals.           Time In: 12:00pm            Time Out:1:05pm    Electronically signed by:  Jack Gant, PTA

## 2021-05-18 NOTE — PRE-CERTIFICATION NOTE
Medicare Cap   [x] Physical Therapy  [] Speech Therapy  [] Occupational therapy  *PT and Speech caps combine      $2100 Limit for PT and Speech combined  $2100 Limit for OT individually  At the beginning of the month where you expect to go over $2100, please add the [address] [place] [address] modifier      Patient Name: Ismael Freedman: 1950    Note:  This is an estimate of charges billed. Date of [address] Name # units/ charge $$$ charge Daily Total Charge Ongoing Total $$$   4/30/21 Vaso+EV    92.18   5-3-21 TE2 vaso   62.3 154.48   5-5-21 TE2+vaso PTA 25.26+19.74+.  7.96 52.96 207.44   5-7-21 TE3+vaso PTA 25.26+19.74*2+  7.96 72.70 280.14   5/10 Ex x3, vaso PTA  72.70 352.84   5/12 Ex x3, vaso PTA  72.70 425.54   5/14 Ex x3, vaso PTA  72.70 498.24   5/18 Ex x3, vaso PTA  72.70 570.94

## 2021-05-21 ENCOUNTER — HOSPITAL ENCOUNTER (OUTPATIENT)
Dept: PHYSICAL THERAPY | Facility: CLINIC | Age: 71
Setting detail: THERAPIES SERIES
Discharge: HOME | End: 2021-05-21
Payer: MEDICARE

## 2021-05-21 PROCEDURE — 97016 VASOPNEUMATIC DEVICE THERAPY: CPT

## 2021-05-21 PROCEDURE — 97110 THERAPEUTIC EXERCISES: CPT

## 2021-05-21 NOTE — FLOWSHEET NOTE
[] [facility]) - [facility] &  Therapy  [address]  [phone]  F: [phone] [] [address]  [facility]   [address]  P: [phone]  F: [phone] [x] [address] &  Therapy  [address]  P: [phone]  F: [phone] [] [address]  P: [phone]  F: [phone] [] [address]  [facility]   [address]   [place]: [phone]  F: [phone]      Physical Therapy Daily Treatment Note    Date:  2021  Patient Name:  Elyssa Jarquin    :  1950  MRN: [id number]   Physician: Anh Milligan MD                         Insurance: Medicare  Medical Diagnosis: Q75.955 (ICD-10-CM) - Hx of total knee replacement, right  Rehab Codes: M62.81, M25.561, R26.2  Onset date: 21 TKR                     Next Dr's appt. : 21  Visit# / total visits: 10/18, Progress note for Medicare patient due at visit 10     Cancels/No Shows: 0/0    Subjective:  Pt states that for the most part her R knee has been doing well. At night is when pain is at its highest when she tries to get comfortable but has been getting better. Currently steps still prove challenging as well as walking [place] Has been able to do her gardening but takes her times and goes about it slowly.    Pain:  [x] Yes  [] No Location: R knee Pain Rating: (0-10 scale) 2-3/10  Pain altered Tx:  [] No  [] Yes  Action:  Comments:          Objective:        Modalities:vasocompression [id number] 34 degrees low compression   Precautions:  Exercises:  Exercise Reps/ Time Weight/ Level Comments    Nustep 10'   x   Calf stretch 30\"x3   x   Standing knee flexion S 5x10\"   x          Step up/down/back x10 4' Bilateral  x   Marching  25x HR 25x      HS curl 25x   x   Mini squats 25x   x   TKE 20x3\" Jesus      BOSU lunges 15x  Bilateral  x   TGym squats/HR 30x2\" L18  x          Heel slides 15x2  W/ strap for last 15 x   SAQ 20x3\" 4#  x   LAQ 20x3\" 4#  x          // ambulation       Marching  3L   x   Sidestep 3L orange  x                 Other: 0°-97°/100° (5-21-21)    [] Specific Instructions for subsequent treatments: scar massage, STR quad    Treatment Charges: Mins Units   []  Modalities     [x]  Ther Exercise 48 3   []  Manual Therapy     []  Ther Activities     []  Aquatics     [x]  Vasocompression 10 1   []  Other     Total Treatment time 58 4       Assessment: [x] Progressing toward goals. [] No change. [x] Other: Continued with nustep followed by stretches with good tolerance. Added // bar amb exercise this date to challenge dynamic stability. Cueing with resisted sidestepping to avoid lateral lean, good follow through. Also increased weight with LAQ/SAQ to address quad strengthening, pt fatigued. No significant increase in pain with completion of today's treatment. Will plan to work on scar mobility over next few visits to improve flexion. [] Patient would continue to benefit from skilled physical therapy services in order to: improve mobility and reduce pain    STG: (to be met in 9 treatments)  ? Pain: Patient to report 5/10 pain at worst - MET  ? ROM: Patient to demonstrate 0-90 degrees knee flexion  ? Strength: - MET  ? Function: Patient to demonstrate the ability to ambulate with a neutral tall posture - MET  Patient to be independent with home exercise program as demonstrated by performance with correct form without cues. Demonstrate Knowledge of fall prevention - MET  LTG: (to be met in 18 treatments)  1. Patient to demonstrate the ability to ambulate without AD using a neutral posture  2. Patient to report 2/10 pain at worsr  3.  Patient to demonstrate 0-115 degrees knee ROM  Patient to report 70/80 on LEFI Patient goals: to be able to walk more upright and move without pain       Pt. Education:  [x] Yes  [] No  [x] Reviewed Prior HEP/Ed  Method of Education: [x] Verbal  [] Demo  [] Written  Comprehension of Education:  [x] Verbalizes understanding. [x] Demonstrates understanding. [x] Needs review. [] Demonstrates/verbalizes HEP/Ed previously given. Plan: [x] Continue current frequency toward long and short term goals.           Time In: 11:00am            Time Out:12:04pm    Electronically signed by:  Sanjeev Monsivais PTA

## 2021-05-23 NOTE — PROGRESS NOTES
[] [facility]) [facility] &  Therapy  [address]  [phone]  F: [phone] [] [address]  [facility]   [address]  P: [phone]  F: [phone] [x] [place]  [address]  P: [phone]  F: [phone] [] [address]  P: [phone]  F: [phone] [] [address]  [address] [facility]   [address]   [place]: [phone]  F: [phone]      Physical Therapy Progress Note    Date: 2021      Patient: Alvin Dove  : 1950  MRN: [id number]    [address]  Veda Whitney MD                                               Insurance: Medicare  Medical Diagnosis: Z96.651 (ICD-10-CM) - Hx of total knee replacement, right  Rehab Codes: M62.81, M25.561, R26.2  Onset date: 21 TKR                                       Next Dr's appt. : 21  Visit# / total visits: 10/18, Progress note for Medicare patient due at visit 18                                  Cancels/No Shows: 0/0    Subjective:  Pain:  [x] Yes  [] No  Location: R knee Pain Rating: (0-10 scale) 0-2/10  Pain altered Tx:  [] No  [] Yes  Action:  Comments: Patient reports improvement in all ADL's, ambulation and reduced pain. She is able to do more around the home and continues to feel better    Objective:  Test Measurements:  R knee ROM 0-97 degrees  Incision: tight  Slight quad lag with SLR    Function:   Ambulation: neutral pattern no AD  LEFI:  was     Assessment:  STG: (to be met in 9 treatments)  1. ? Pain: Patient to report 5/10 pain at worst - MET  2. ? ROM: Patient to demonstrate 0-90 degrees knee flexion-MET  3. ? Strength: -  4. ? Function: Patient to demonstrate the ability to ambulate with a neutral tall posture - MET  5.  Patient to be independent with home exercise program as demonstrated by performance with correct form without cues. 6. Demonstrate Knowledge of fall prevention - MET  LTG: (to be met in 18 treatments)-ON GOING  1. Patient to demonstrate the ability to ambulate without AD using a neutral posture  2. Patient to report 2/10 pain at worst  3. Patient to demonstrate 0-115 degrees knee ROM  Patient to report 70/80 on LEFI                 Patient goals: to be able to walk more upright and move without pain  Treatment Plan:  [x] Therapeutic Exercise   28362  [] Iontophoresis: 4 mg/mL Dexamethasone Sodium Phosphate  mAmin  07776   [] Therapeutic Activity  94218 [] Vasopneumatic cold with compression  71375    [x] Gait Training   74918 [] Ultrasound   38447   [x] Neuromuscular Re-education  76281 [] Electrical Stimulation Unattended  95355   [x] Manual Therapy  70015 [] Electrical Stimulation Attended  99073   [x] Instruction in HEP  [] Lumbar/Cervical Traction  20174   [] Aquatic Therapy   82528 [] Cold/hotpack    [] Massage   85704      [] Dry Needling, 1 or 2 muscles  65762   [] Biofeedback, first 15 minutes   22648  [] Biofeedback, additional 15 minutes   04045 [] Dry Needling, 3 or more muscles  33709       Patient Status:     [x] Continue per initial plan of care. [] Additional visits necessary. [] Other:     Requested Frequency/Duration: 2-3 times per week for 18 treatments. Electronically signed by Reji Charles PT on 5/23/2021 at 1:53 PM    If you have any questions or concerns, please don't hesitate to call. Thank you for your referral.    Physician Signature:________________________________Date:__________________  By signing above or cosigning this note, I have reviewed this plan of care and certify a need for medically necessary rehabilitation services.      *PLEASE SIGN ABOVE AND FAX BACK ALL PAGES*

## 2021-05-25 ENCOUNTER — HOSPITAL ENCOUNTER (OUTPATIENT)
Dept: PHYSICAL THERAPY | Facility: CLINIC | Age: 71
Setting detail: THERAPIES SERIES
Discharge: HOME | End: 2021-05-25
Payer: MEDICARE

## 2021-05-25 PROCEDURE — 97110 THERAPEUTIC EXERCISES: CPT

## 2021-05-25 PROCEDURE — 97016 VASOPNEUMATIC DEVICE THERAPY: CPT

## 2021-05-25 PROCEDURE — 97140 MANUAL THERAPY 1/> REGIONS: CPT

## 2021-05-25 NOTE — FLOWSHEET NOTE
// ambulation       Marching  3L   x   Sidestep 3L orange  x                 Other: 0°-100°/103° (5-25-21)  Scar massage and hypervolt to R quad     [] Specific Instructions for subsequent treatments:     Treatment Charges: Mins Units   []  Modalities     [x]  Ther Exercise 35 2   [x]  Manual Therapy 10 1   []  Ther Activities     []  Aquatics     [x]  Vasocompression 10 1   []  Other     Total Treatment time 55 4       Assessment: [x] Progressing toward goals. [] No change. [x] Other: continued with nustep and stretches followed by manual with pt able to increase R knee flexion ROM post manual this date. Progressed pt with increasing reps for mini squats and HS curls as well as increasing level for TG squats with good tolerance. Pt noting some increased pain at end but stated more muscle fatigue and soreness. Ended with vaso for decreased symptoms. [] Patient would continue to benefit from skilled physical therapy services in order to: improve mobility and reduce pain    STG: (to be met in 9 treatments)  1. ? Pain: Patient to report 5/10 pain at worst - MET  2. ? ROM: Patient to demonstrate 0-90 degrees knee flexion  3. ? Strength: - MET  4. ? Function: Patient to demonstrate the ability to ambulate with a neutral tall posture - MET  5. Patient to be independent with home exercise program as demonstrated by performance with correct form without cues. 6. Demonstrate Knowledge of fall prevention - MET  LTG: (to be met in 18 treatments)  1. Patient to demonstrate the ability to ambulate without AD using a neutral posture  2. Patient to report 2/10 pain at worsr  3. Patient to demonstrate 0-115 degrees knee ROM  Patient to report 70/80 on LEFI                 Patient goals: to be able to walk more upright and move without pain       Pt.  Education:  [x] Yes  [] No  [x] Reviewed Prior HEP/Ed  Method of Education: [x] Verbal  [] Demo  [] Written  Comprehension of Education:  [x] Blayne Medina understanding. [x] Demonstrates understanding. [x] Needs review. [] Demonstrates/verbalizes HEP/Ed previously given. Plan: [x] Continue current frequency toward long and short term goals.           Time In: 8:00 am           Time Out: 9:05 am    Electronically signed by:  Kassie Borja PTA

## 2021-05-27 ENCOUNTER — HOSPITAL ENCOUNTER (OUTPATIENT)
Dept: PHYSICAL THERAPY | Facility: CLINIC | Age: 71
Setting detail: THERAPIES SERIES
Discharge: HOME | End: 2021-05-27
Payer: MEDICARE

## 2021-05-27 PROCEDURE — 97016 VASOPNEUMATIC DEVICE THERAPY: CPT

## 2021-05-27 PROCEDURE — 97110 THERAPEUTIC EXERCISES: CPT

## 2021-05-27 NOTE — FLOWSHEET NOTE
[] [facility]) - [facility] &  Therapy  [address]  [phone]  F: [phone] [] [address]  [employer]   [address]  P: [phone]  F: [phone] [x] [address] &  Therapy  [address]  P: [phone]  F: [phone] [] [address]  P: [phone]  F: [phone] [] [address]  [facility]   [address]   [place]: [phone]  F: [phone]      Physical Therapy Daily Treatment Note    Date:  2021  Patient Name:  Evelia Sun    :  1950  MRN: [id number]   Physician: Ivonne Giraldo MD                         Insurance: Medicare  Medical Diagnosis: T08.663 (ICD-10-CM) - Hx of total knee replacement, right  Rehab Codes: M62.81, M25.561, R26.2  Onset date: 21 TKR                     Next Dr's appt. : 21  Visit# / total visits: , Progress note for Medicare patient due at visit 10     Cancels/No Shows: 0/0    Subjective:   Pain:  [x] Yes  [] No Location: R knee Pain Rating: (0-10 scale) 0/10  Pain altered Tx:  [] No  [] Yes  Action:  Comments:  Pt with no c/o today.      Objective:  Modalities:vasocompression [id number] 34 degrees low compression   Precautions:  Exercises:  Exercise Reps/ Time Weight/ Level Comments    Nustep 10'      Calf stretch 30\"x3      Standing knee flexion S 5x10\"             Step up/down/back/lateral x10 4' Bilateral     HS curl 30x      Mini squats 30x      BOSU lunges 15x  Bilateral     TGym squats/HR 30x2\" L20            Heel slides 15x2  W/ strap for last 15    SAQ 20x3\" 4#     LAQ 20x3\" 4#            // ambulation       Marching  3L      Sidestep 3L orange                   Other: 0°-100°/103° (5-25-21)      [] Specific Instructions for subsequent treatments:     Treatment Charges: Mins Units   []  Modalities     [x]  Ther Exercise 35 2   []  Manual Therapy     []  Ther Activities     []  Aquatics     [x]  Vasocompression 10 1   []  Other     Total Treatment time 45 4       Assessment: [x] Progressing toward goals. [] No change. [x] Other: Pt demo better form with all standing exercises. Pt able to perform retro step up with less use of UE support. Will continue to work on strengthening to begin to wean off of UE support. Ended with vaso for decreased symptoms. [] Patient would continue to benefit from skilled physical therapy services in order to: improve mobility and reduce pain    STG: (to be met in 9 treatments)  1. ? Pain: Patient to report 5/10 pain at worst - MET  2. ? ROM: Patient to demonstrate 0-90 degrees knee flexion  3. ? Strength: - MET  4. ? Function: Patient to demonstrate the ability to ambulate with a neutral tall posture - MET  5. Patient to be independent with home exercise program as demonstrated by performance with correct form without cues. 6. Demonstrate Knowledge of fall prevention - MET  LTG: (to be met in 18 treatments)  1. Patient to demonstrate the ability to ambulate without AD using a neutral posture  2. Patient to report 2/10 pain at worsr  3. Patient to demonstrate 0-115 degrees knee ROM  Patient to report 70/80 on LEFI                 Patient goals: to be able to walk more upright and move without pain       Pt. Education:  [x] Yes  [] No  [x] Reviewed Prior HEP/Ed  Method of Education: [x] Verbal  [] Demo  [] Written  Comprehension of Education:  [x] Verbalizes understanding. [x] Demonstrates understanding. [x] Needs review. [] Demonstrates/verbalizes HEP/Ed previously given. Plan: [x] Continue current frequency toward long and short term goals.           Time In:12:03pm           Time Out: 12:51pm    Electronically signed by:  eMron Hernández PTA

## 2021-06-01 ENCOUNTER — HOSPITAL ENCOUNTER (OUTPATIENT)
Dept: PHYSICAL THERAPY | Facility: CLINIC | Age: 71
Setting detail: THERAPIES SERIES
Discharge: HOME | End: 2021-06-01
Payer: MEDICARE

## 2021-06-01 PROCEDURE — 97016 VASOPNEUMATIC DEVICE THERAPY: CPT

## 2021-06-01 PROCEDURE — 97110 THERAPEUTIC EXERCISES: CPT

## 2021-06-03 ENCOUNTER — HOSPITAL ENCOUNTER (OUTPATIENT)
Dept: PHYSICAL THERAPY | Facility: CLINIC | Age: 71
Setting detail: THERAPIES SERIES
Discharge: HOME | End: 2021-06-03
Payer: MEDICARE

## 2021-06-03 PROCEDURE — 97110 THERAPEUTIC EXERCISES: CPT

## 2021-06-03 PROCEDURE — 97016 VASOPNEUMATIC DEVICE THERAPY: CPT

## 2021-06-03 NOTE — FLOWSHEET NOTE
Ther Activities     []  Aquatics     [x]  Vasocompression 10 1   []  Other     Total Treatment time  55 3       Assessment: [x] Progressing toward goals. [] No change. [x] Other: Pt tolerates all stretches and strengthening therex well. Pt mentions increased arch pain with heel raises so held today. Completed 4 way hip CKC this date to progress RLE stability, cueing to limit UE support. Continued with hypervolt to R distal quad to improve flexibility. Vaso applied after to control symptoms. [] Patient would continue to benefit from skilled physical therapy services in order to: improve mobility and reduce pain    STG: (to be met in 9 treatments)  1. ? Pain: Patient to report 5/10 pain at worst - MET  2. ? ROM: Patient to demonstrate 0-90 degrees knee flexion  3. ? Strength: - MET  4. ? Function: Patient to demonstrate the ability to ambulate with a neutral tall posture - MET  5. Patient to be independent with home exercise program as demonstrated by performance with correct form without cues. 6. Demonstrate Knowledge of fall prevention - MET  LTG: (to be met in 18 treatments)  1. Patient to demonstrate the ability to ambulate without AD using a neutral posture  2. Patient to report 2/10 pain at worsr  3. Patient to demonstrate 0-115 degrees knee ROM  Patient to report 70/80 on LEFI                 Patient goals: to be able to walk more upright and move without pain       Pt. Education:  [x] Yes  [] No  [x] Reviewed Prior HEP/Ed  Method of Education: [x] Verbal  [] Demo  [] Written  Comprehension of Education:  [x] Verbalizes understanding. [x] Demonstrates understanding. [x] Needs review. [] Demonstrates/verbalizes HEP/Ed previously given. Plan: [x] Continue current frequency toward long and short term goals.           Time In:   9:00 am           Time Out: 10:02am    Electronically signed by:  Adan Fitzgerald PTA

## 2021-06-03 NOTE — PRE-CERTIFICATION NOTE
Medicare Cap   [x] Physical Therapy  [] Speech Therapy  [] Occupational therapy  *PT and Speech caps combine      $2100 Limit for PT and Speech combined  $2100 Limit for OT individually  At the beginning of the month where you expect to go over $2100, please add the [address] [place] [address] modifier      Patient Name: Jd Nicholas: 1950    Note:  This is an estimate of charges billed. Date of [address] Name # units/ charge $$$ charge Daily Total Charge Ongoing Total $$$   4/30/21 Vaso+EV    92.18   5-3-21 TE2 vaso   62.3 154.48   5-5-21 TE2+vaso PTA 25.26+19.74+.  7.96 52.96 207.44   5-7-21 TE3+vaso PTA 25.26+19.74*2+  7.96 72.70 280.14   5/10 Ex x3, vaso PTA  72.70 352.84   5/12 Ex x3, vaso PTA  72.70 425.54   5/14 Ex x3, vaso PTA  72.70 498.24   5/18 Ex x3, vaso PTA  72.70 570.94   5/21 Ex x3, vaso PTA  72.70 643.64   5/25 Ex x2, man, vaso PTA  71.41 715.05   5/27 Ex x2, vaso   52.96 768.01   6/01 Ex x2, vaso   52.96 820.97   6/03 Ex x3, vaso   72.70 893.67

## 2021-06-08 ENCOUNTER — HOSPITAL ENCOUNTER (OUTPATIENT)
Dept: PHYSICAL THERAPY | Facility: CLINIC | Age: 71
Setting detail: THERAPIES SERIES
Discharge: HOME | End: 2021-06-08
Payer: MEDICARE

## 2021-06-08 PROCEDURE — 97110 THERAPEUTIC EXERCISES: CPT

## 2021-06-08 PROCEDURE — 97016 VASOPNEUMATIC DEVICE THERAPY: CPT

## 2021-06-08 NOTE — FLOWSHEET NOTE
Aquatics     [x]  Vasocompression 10 1   []  Other     Total Treatment time  50 3       Assessment: [x] Progressing toward goals. Pt with improved performance with exercises. Pt no longer goes into circumduction when ascending steps or hurdles. Pt does fatigue quickly with standing exercises. Continued with vaso at the end to help with swelling reduction. [] No change. [x] Other:    [] Patient would continue to benefit from skilled physical therapy services in order to: improve mobility and reduce pain    STG: (to be met in 9 treatments)  1. ? Pain: Patient to report 5/10 pain at worst - MET  2. ? ROM: Patient to demonstrate 0-90 degrees knee flexion  3. ? Strength: - MET  4. ? Function: Patient to demonstrate the ability to ambulate with a neutral tall posture - MET  5. Patient to be independent with home exercise program as demonstrated by performance with correct form without cues. 6. Demonstrate Knowledge of fall prevention - MET  LTG: (to be met in 18 treatments)  1. Patient to demonstrate the ability to ambulate without AD using a neutral posture  2. Patient to report 2/10 pain at worsr  3. Patient to demonstrate 0-115 degrees knee ROM  Patient to report 70/80 on LEFI                 Patient goals: to be able to walk more upright and move without pain       Pt. Education:  [x] Yes  [] No  [x] Reviewed Prior HEP/Ed  Method of Education: [x] Verbal  [] Demo  [] Written  Comprehension of Education:  [x] Verbalizes understanding. [x] Demonstrates understanding. [x] Needs review. [] Demonstrates/verbalizes HEP/Ed previously given. Plan: [x] Continue current frequency toward long and short term goals.           Time In:  12:00pm           Time Out: 1:00pm    Electronically signed by:  Nallely Velez PTA

## 2021-06-10 ENCOUNTER — HOSPITAL ENCOUNTER (OUTPATIENT)
Dept: PHYSICAL THERAPY | Facility: CLINIC | Age: 71
Setting detail: THERAPIES SERIES
Discharge: HOME | End: 2021-06-10
Payer: MEDICARE

## 2021-06-10 PROCEDURE — 97016 VASOPNEUMATIC DEVICE THERAPY: CPT

## 2021-06-10 PROCEDURE — 97110 THERAPEUTIC EXERCISES: CPT

## 2021-06-10 NOTE — FLOWSHEET NOTE
[x] [address]   Outpatient Rehabilitation &  Therapy  [address] [facility]  P: [phone]  F: [phone] [] [address]  P: [phone]  F: [phone] [] [address]  [facility]   [place]: [phone]  F: [phone]      Physical Therapy Daily Treatment Note    Date:  6/10/2021  Patient Name:  Joesluis Velarde    :  1950  MRN: [id number]   Physician: Krystal Suárez MD                         Insurance: Medicare  Medical Diagnosis: V94.403 (ICD-10-CM) - Hx of total knee replacement, right  Rehab Codes: M62.81, M25.561, R26.2  Onset date: 21 TKR                     Next 's appt. : 21  Visit# / total visits: 15/18, Progress note for Medicare patient due at visit 18     Cancels/No Shows: 0/0    Subjective:      Pain:  [] Yes  [x] No Location: R knee Pain Rating: (0-10 scale) 0/10  Pain altered Tx:  [] No  [] Yes  Action:  Comments: no pain currently just continues to mention increased pain at night but overall doing well.      Objective:  Modalities:vasocompression [id number] 34 degrees low compression   Precautions:  Exercises:  Exercise Reps/ Time Weight/ Level Comments    Nustep 10'   x   Calf/gastroc stretch 30\"x3   x   Standing knee flexion S 5x10\"             Step Ups lateral x15 6'  x   Step up/over 15x 6\"  x   HS curl 30x   x   Mini squats 30x   x   BOSU lunges 15x  Bilateral     TGym squats/HR 20x2\" L13 RLE x   4 way hip 15x lime Limit UE support x          Bridges 20x   x   Heel slides 15x2  W/ strap for last 15 x   SAQ 30x3\" 5#  x   LAQ 30x3\" 5#  x          Ambulation        Hurdles   4L  Outside bars                  Other: 0°-103° (6--21)  Hypervolt R quad - 5'      [] Specific Instructions for subsequent treatments:     Treatment Charges: Mins Units   []  Modalities     [x]  Ther Exercise 45 3   []  Manual Therapy      []  Ther Activities     []  Aquatics     [x]  Vasocompression 10 1   []  Other     Total Treatment time  55 3       Assessment: [x] Progressing toward goals. Continued with stretches and strengthening therex with good pt tolerance. Added single leg TGym squats to continued to advance RLE strengthening, increased weight with LAQ as well. Mod cueing needed to review 4 way hip, completed bilaterally to work on stability. [] No change. [] Other:    [x] Patient would continue to benefit from skilled physical therapy services in order to: improve mobility and reduce pain    STG: (to be met in 9 treatments)  1. ? Pain: Patient to report 5/10 pain at worst - MET  2. ? ROM: Patient to demonstrate 0-90 degrees knee flexion  3. ? Strength: - MET  4. ? Function: Patient to demonstrate the ability to ambulate with a neutral tall posture - MET  5. Patient to be independent with home exercise program as demonstrated by performance with correct form without cues. 6. Demonstrate Knowledge of fall prevention - MET    LTG: (to be met in 18 treatments)  1. Patient to demonstrate the ability to ambulate without AD using a neutral posture  2. Patient to report 2/10 pain at worsr  3. Patient to demonstrate 0-115 degrees knee ROM  Patient to report 70/80 on LEFI                 Patient goals: to be able to walk more upright and move without pain       Pt. Education:  [x] Yes  [] No  [x] Reviewed Prior HEP/Ed  Method of Education: [x] Verbal  [] Demo  [] Written  Comprehension of Education:  [x] Verbalizes understanding. [x] Demonstrates understanding. [] Needs review. [] Demonstrates/verbalizes HEP/Ed previously given. Plan: [x] Continue current frequency toward long and short term goals.           Time In:  12:00pm           Time Out: 1:00pm    Electronically signed by:  Rosalie Armendariz PTA

## 2021-06-15 ENCOUNTER — HOSPITAL ENCOUNTER (OUTPATIENT)
Dept: PHYSICAL THERAPY | Facility: CLINIC | Age: 71
Setting detail: THERAPIES SERIES
Discharge: HOME | End: 2021-06-15
Payer: MEDICARE

## 2021-06-15 PROCEDURE — 97016 VASOPNEUMATIC DEVICE THERAPY: CPT

## 2021-06-15 PROCEDURE — 97110 THERAPEUTIC EXERCISES: CPT

## 2021-06-15 NOTE — PRE-CERTIFICATION NOTE
Medicare Cap   [x] Physical Therapy  [] Speech Therapy  [] Occupational therapy  *PT and Speech caps combine      $2100 Limit for PT and Speech combined  $2100 Limit for OT individually  At the beginning of the month where you expect to go over $2100, please add the [address] [place] modifier      Patient Name: Libby Donohue: 1950    Note:  This is an estimate of charges billed. Date of [address] Name # units/ charge $$$ charge Daily Total Charge Ongoing Total $$$   21 Vaso+EV    92.18   5-3-21 TE2 vaso   62.3 154.48   21 TE2+vaso PTA 25.26+19.74+.  7.96 52.96 207.44   21 TE3+vaso PTA 25.26+19.74*2+  7.96 72.70 280.14   5/10 Ex x3, vaso PTA  72.70 352.84    Ex x3, vaso PTA  72.70 425.54    Ex x3, vaso PTA  72.70 498.24    Ex x3, vaso PTA  72.70 570.94    Ex x3, vaso PTA  72.70 643.64    Ex x2, man, vaso PTA  71.41 715.05    Ex x2, vaso   52.96 768.01    Ex x2, vaso   52.96 820.97    Ex x3, vaso   72.70 893.67    Ex x3, vaso   72.70 966.37   6/10 Ex x3, vaso   72.20 1039.07   6/15 3 Therex, 1 vaso  29.21+22.84+9.21 61.26 1100.33

## 2021-06-15 NOTE — FLOWSHEET NOTE
for subsequent treatments:     Treatment Charges: Mins Units   []  Modalities     [x]  Ther Exercise 36 2   []  Manual Therapy      []  Ther Activities     []  Aquatics     [x]  Vasocompression 10 1   []  Other     Total Treatment time 46 3       Assessment: [x] Progressing toward goals. Initiated session with [employer] to warm up. Increased resistance to blue tband for 3 way hip this date with fair tolerance. Added marches and chair squat taps this date to progress LE strength with good tolerance, providing VCs to avoid excessive knee flexion during squat taps. Attempted without UE but pt was unable to due to weakness. Measured knee ext ROM to lacking 4degs after SAQ exercise and knee flexion ROM to 100deg after heel slide. Ended session with vaso to reduce soreness and stiffness with some relief at end of session. Will continue to progress as tolerated. [] No change. [] Other:    [x] Patient would continue to benefit from skilled physical therapy services in order to: improve mobility and reduce pain    STG: (to be met in 9 treatments)  1. ? Pain: Patient to report 5/10 pain at worst - MET  2. ? ROM: Patient to demonstrate 0-90 degrees knee flexion  3. ? Strength: - MET  4. ? Function: Patient to demonstrate the ability to ambulate with a neutral tall posture - MET  5. Patient to be independent with home exercise program as demonstrated by performance with correct form without cues. 6. Demonstrate Knowledge of fall prevention - MET    LTG: (to be met in 18 treatments)  1. Patient to demonstrate the ability to ambulate without AD using a neutral posture  2. Patient to report 2/10 pain at worsr  3. Patient to demonstrate 0-115 degrees knee ROM  Patient to report 70/80 on LEFI                 Patient goals: to be able to walk more upright and move without pain       Pt.  Education:  [x] Yes  [] No  [x] Reviewed Prior HEP/Ed  Method of Education: [x] Verbal  [] Demo  [] Written  Comprehension of Education:  [x]

## 2021-06-17 ENCOUNTER — HOSPITAL ENCOUNTER (OUTPATIENT)
Dept: PHYSICAL THERAPY | Facility: CLINIC | Age: 71
Setting detail: THERAPIES SERIES
Discharge: HOME | End: 2021-06-17
Payer: MEDICARE

## 2021-06-17 PROCEDURE — 97110 THERAPEUTIC EXERCISES: CPT

## 2021-06-17 NOTE — PRE-CERTIFICATION NOTE
Medicare Cap   [x] Physical Therapy  [] Speech Therapy  [] Occupational therapy  *PT and Speech caps combine      $2100 Limit for PT and Speech combined  $2100 Limit for OT individually  At the beginning of the month where you expect to go over $2100, please add the [address] [place] [address] modifier      Patient Name: Criselda Hernandez: 1950    Note:  This is an estimate of charges billed. Date of [address] Name # units/ charge $$$ charge Daily Total Charge Ongoing Total $$$   4/30/21 Vaso+EV    92.18   5-3-21 TE2 vaso   62.3 154.48   5-5-21 TE2+vaso PTA 25.26+19.74+.  7.96 52.96 207.44   5-7-21 TE3+vaso PTA 25.26+19.74*2+  7.96 72.70 280.14   5/10 Ex x3, vaso PTA  72.70 352.84   5/12 Ex x3, vaso PTA  72.70 425.54   5/14 Ex x3, vaso PTA  72.70 498.24   5/18 Ex x3, vaso PTA  72.70 570.94   5/21 Ex x3, vaso PTA  72.70 643.64   5/25 Ex x2, man, vaso PTA  71.41 715.05   5/27 Ex x2, vaso   52.96 768.01   6/01 Ex x2, vaso   52.96 820.97   6/03 Ex x3, vaso   72.70 893.67   6/08 Ex x3, vaso   72.70 966.37   6/10 Ex x3, vaso   72.20 1039.07   6/15 3 Therex, 1 vaso  29.21+22.84+9.21 61.26 1100.33   6/17/21 TE3   74.89 1175.22

## 2021-06-17 NOTE — PROGRESS NOTES
[] [address]  [address]  [phone]  F: [phone] [] [address]  [facility]   [address]  P: [phone]  F: [phone] [x] [address]  [address] [facility]  P: [phone]  F: [phone] [] [address]  P: [phone]  F: [phone] [] [address]  [address] [facility]   [address]   [place]: [phone]  F: [phone]      Physical Therapy Progress/Discharge Note  Date: 2021      Patient: Alvin Dove  : 1950  MRN: [id number]    [address] MD                                               Insurance: Medicare  Medical Diagnosis: Z96.651 (ICD-10-CM) - Hx of total knee replacement, right  Rehab Codes: M62.81, M25.561, R26.2  Onset date: 21 TKR                                       Next Dr's appt. : 21  Visit# / total visits: 17/18                                  Cancels/No Shows: 0/0    Subjective:  Pain:  [] Yes  [x] No  Location: R knee Pain Rating: (0-10 scale) /10  Pain altered Tx:  [] No  [] Yes  Action:  Comments: Patient reports doing much better and is slowly returning to pre surgery activities    Objective:  Test Measurements:  R knee ROM  0-110 Was 0-97 degrees    Function:   Ambulation: neutral pattern no AD  LEFI: was 44/80 was 28/80   SL balance challenges needs UE assist    Exercises:  Exercise Reps/ Time Weight/ Level Comments     Nustep 8'     x   Calf/gastroc stretch 2'     x   Standing knee flexion S 5x10\"                     Step Ups lateral 15x 6\"   x   Step up/over 15x 6\"   x   HS curl 30x        marches 20x   Added 6/15    Mini squats 30x         lunges 15x   Bilat  x   TGym squats/HR 20x2\" L15 RLE    3 way hip 15x  Limit UE support x   Chair squat taps+ 15x   Added 6/15 (UE support) x               Bridges 20x         Heel slides 15x2   W/ strap for last 15 x   SAQ 30x3\" 5#   x   LAQ 30x3\" 5#   x                          Hurdles   4L   Outside bars x                             Treatment Charges: Mins Units   []? Modalities       [x]? Ther Exercise 45 3   []? Manual Therapy       []? Ther Activities       []? Aquatics       []? Vasocompression     []? Other       Total Treatment time 45 3      Assessment:  STG: (to be met in 9 treatments)  1. ? Pain: Patient to report 5/10 pain at worst - MET  2. ? ROM: Patient to demonstrate 0-90 degrees knee flexion-MET  3. ? Strength: -  4. ? Function: Patient to demonstrate the ability to ambulate with a neutral tall posture - MET  5. Patient to be independent with home exercise program as demonstrated by performance with correct form without cues. 6. Demonstrate Knowledge of fall prevention - MET  LTG: (to be met in 18 treatments)-ON GOING  1. Patient to demonstrate the ability to ambulate without AD using a neutral posture  2. Patient to report 2/10 pain at worst--MET  3. Patient to demonstrate 0-115 degrees knee ROM--ON GOING  Patient to report 70/80 on LEFI--ON GOING                 Patient goals: to be able to walk more upright and move without pain  Treatment Plan:  [x] Therapeutic Exercise   40315  [] Iontophoresis: 4 mg/mL Dexamethasone Sodium Phosphate  mAmin  27790   [] Therapeutic Activity  38210 [] Vasopneumatic cold with compression  38589    [x] Gait Training   01771 [] Ultrasound   42761   [x] Neuromuscular Re-education  62008 [] Electrical Stimulation Unattended  83117   [x] Manual Therapy  38440 [] Electrical Stimulation Attended  07145   [x] Instruction in HEP  [] Lumbar/Cervical Traction  [id number]       Patient Status:     [] Continue per initial plan of care. [] Additional visits necessary. [x] Other:Patient demonstrated improved mobility and gait strength.  VC's given in how to improve balance and patient educated in a HEP to continue progression. All questions were answered and HO given. Due to insurance limitations patient to hold PT at this time and continue with HEP till  Follow up with physician. Electronically signed by Silvana De Dios PT on 6/17/2021 at 12:50 PM    If you have any questions or concerns, please don't hesitate to call. Thank you for your referral.    Physician Signature:________________________________Date:__________________  By signing above or cosigning this note, I have reviewed this plan of care and certify a need for medically necessary rehabilitation services.      *PLEASE SIGN ABOVE AND FAX BACK ALL PAGES*

## 2022-03-04 ENCOUNTER — HOSPITAL ENCOUNTER (OUTPATIENT)
Dept: PHYSICAL THERAPY | Facility: CLINIC | Age: 72
Setting detail: THERAPIES SERIES
Discharge: HOME | End: 2022-03-04
Payer: MEDICARE

## 2022-03-04 PROCEDURE — 97161 PT EVAL LOW COMPLEX 20 MIN: CPT

## 2022-03-04 PROCEDURE — 97530 THERAPEUTIC ACTIVITIES: CPT

## 2022-03-04 PROCEDURE — 95992 CANALITH REPOSITIONING PROC: CPT

## 2022-03-04 NOTE — PROGRESS NOTES
Physical Therapy             [x] [address]  P: [phone]  F: [phone]    PHYSICAL THERAPY VESTIBULAR EVALUATION      Date:  3/4/2022  Patient: Rick Alejandro  : 1950  MRN: [id number]   Referring Provider: Leila Ramirez DO  Insurance: Medicare, Secondary Insurance (if applicable) [address] Medicare Supp, d/remaining: based on [facility],  Auth: [facility]  Medical Diagnosis/ Rehab Codes: H81.10 (ICD-10-CM) - Benign paroxysmal vertigo, unspecified ear  R42 -VERTIGO, R26.81 -IMBALANCE, R11.0 -NAUSEA  Onset date:  2022 Next 's appt.: TBD    Subjective:   HPI: PATIENT REPORTS THIS PROBLEMS SUDDENLY WHEN SHE GOT OUT OF THE BED ABOUT 2 WEEKS AGO, UNKNOWN ORIGIN. SHE DENIES INJURY OR ILLNESS. CC:  PATIENT REPORT BOUTS OF  AS SPINNING SENSATION BROUGHT ON BY GETS OUT OF BED AND ROLLING TO BOTH SIDES AND LOOKING UP.  THESE BOUTS LAST LESS THAN ONE MINUTES AND SHE HAS ASSOCIATED NAUSEA. BETWEEN BOUTS SHE NOTES A SWAYING SENSATION AND NEW INCREASED IMBALANCE. PRESENTLY SWAYING SENSATION. SHE ALSO REPORTS NOW ONSET CERVICAL STIFFNESS. SHE IS A LEFT SIDE SLEEPER. PMHx:    [] Unremarkable [] Diabetes  []MI/Heart Problems   [] Pacemaker  [x] HTN   [] Cancer   [x] Arthritis:   [x] Surgeries B TKA  [] Other:  EPIC:   has no past medical history on file. has no past surgical history on file.     Allergies: [] NKA  [x] Refer to intake sheet  Medication: [x] Refer to intake sheet  [] None  MECLIZINE FOR THIS PROBLEM  Test: [] X-Ray  [] MRI [] CT Scan   [] Other  Comments: NO TESTING FOR THIS PROBLEM    Pain:    /10  [x] No c/o pain    Pain altered Tx: [x] No  [] Yes Action:  Symptoms: [] Improving  [] Worsening  [x] Same  Sleep: [x] OK  [] Disturbed    Fall History: WHILE WASHING WINDOWS LAST SUMMER, SHE MISSED A STEP AND FELL  Associated Ear Symptoms: HISTORY OF HEARING LOSS, NO ASSOCIATED EAR SYMPTOMS     Function:    Hand Dominance [x] Right  [] Left   Patient lives with: Terese Parsons   In what type of home []  One story   [x] Two story   [] Split level   Number of stairs to enter    With handrail on the [x]  Right to enter   [x] Left to enter []  Both   Bathroom has a []  Tub only  [] Tub/shower combo   [] Walk in shower    []  Grab bars   Washing machine is on []  Main level   [] Second level   [x] Basement   Employer    Job Status []  Normal duty   [] Light duty   [] Off due to condition    [x]  Retired   [] Not employed   [] Disability  [] Other:  []  Return to work: Work activities/duties PATIENT IS INDEP IN ADL'S AND SELF CARE. SHE PARTICIPATES IN A WEEKLY AQUATIC EXERCISE PROGRAM.  SHE IS ABLE TO DRIVE WITHOUT RESTRICTIONS. SHE IS LITTLE MORE CAUTIONS WITH HEAD MOVEMENT DUE TO THIS PROBLEM.    Assistive Device:  [x] None   [] Straight Cane  [] Quad Cane   [] Walker []  Wh Alline Lowe []4 Rolena Muster Alline Lowe with Seat [] Other:      Subjective Measure Score Indications   Dizziness Handicap Inventory (DHI) 24/100 BPPV SUBSCALE: 18 /20   Activities-Specific Balance Confidence (ABC) 86%    Comment: DHI HIGHLY PREDICTIVE OF BPPV    Objective:    OBSERVATION No Deficit Deficit Not Tested Comments   Posture       Forward Head [] [x] []    Head/Neck Alignment [] [x] []    Rounded Shoulders [] [x] []    Kyphosis [] [x] [] DECREASED   Lordosis [x] [] []    Lateral Shift [x] [] []    Scoliosis [] [] [x]    Iliac Crest [x] [] []    Genu Valgus [x] [] []    Genu Varus [x] [] []    Genu Recurvatum [x] [] []    Other       Palpation [] [] [x]    Sensation [] [x] [] R FOOT NUMBNESS,    Edema [x] [] [] B LE'S   Neurological Signs [] [] [x]    Deep Tendon Reflex [] [] [x]    Strength [] [] [x] FUNCTIONAL FOR AMBULATION         Cervical spine AROM:  Motion Percent Normal AROM Pain/Symptoms   Flexion            100     % [] Pain   [x] Dizziness   [] Other:   Extension              75     % [] Pain   [] Dizziness   [] Other:   (R), (L) Rotation       B   % [] Pain   [] Dizziness [] Other:   (R), (L) Side Bending             B 50  % [] Pain   [] Dizziness   [x] Other: STIFF       Gait: PATIENT IS ABLE TO WALK COMMUNITY DISTANCES WITHOUT AN ASSISTIVE DEVICE. NO GAIT DEFICIT OBSERVED. VESTIBULAR OCULOMOTOR SCREENING:  TEST ROOM LIGHT WITHOUT FIXATION-IR GOGGLES   MODIFIED VERTEBRAL ARTERY TEST IN SITTING NO REPORTS OF SYMPTOMS    SPONTANEOUS NYSTAGMUS NEGATIVE NEGATIVE   GAZE HOLDING NYSTAGMUS (L):  NEG (L):  NEG    (R):  NEG (R): NEG    UP: NEG UP: NEG   SMOOTH PURSUIT NEG    SACCADES NEG    VOR CANCELLATION NEG    VOR TO SLOW HEAD MOVEMENT NEG    VOR TO FAST HEAD MOVEMENT(HEAD THRUST) (L): NOT TESTED     (R):   NT    HEAD SHAKING NYSTAGMUS  NOT TESTED   RIGHT NORMA-HALLPIKE  NEGATIVE   LEFT NORMA-HALLPIKE  DELAYED ONSET, BRISK UP BEATING NYSTAGMUS WITH ROTATION TO THE LEFT LASTING ABOUT 20 SECONDS THEN CONVERTING TO VERY MILD HORIZONTAL NYSTAGMUS WAS OBSERVED. PATIENT REPORTED SEVERE VERTIGO   SUPINE HEAD CENTER  TEST NEXT SESSION   ROLL TEST (L):   (L):  TEST NEXT SESSION    (R):   (R): NEXT SESSION    BOW AND LEAN TEST  BOW: TEST PRN  LEAN:          Assessment:PATIENT WOULD BENEFIT FROM CONTINUED PHYSICAL THERAPY TO ADDRESS THE PROBLEMS LISTED BELOW AND TO WORK TOWARDS REACHING THE STATED GOALS. Problems:                                                                                     [x]  Vertigo  []  Difficulty walking straight course                                     [x]  Positive Aurelia Hallpike/Roll Test for BPPV  []   Gait Dysfunction:                                            []  Static balance deficit: mCTSIB  []  Dynamic balance deficit: Deleon Balance Scale Janifer Miroslava),  []   Functional Gait Assessment (FGA)  [x]  ? Function: Dizziness Handicap Inventory [facility]):   [x]  Decreased Balance Confidence:   [x]  Other: FALL RISK, HISTORY OF FALL      Short Term Goals: (   2-5    Treatments)  [x] 1. Patient will report 7 days with no vertigo   [] 2.  Patient will report --% improvement in dizziness and --% improvement in imbalance  [] 3. Improve gait mechanics, trajectory  [x] 4. Negative Gene Hallpike and Roll Test for BPPV  [] 5. Improved static balance  [] 6. Improved dynamic balance: increased FGA ( /30)  [x] 7. Decreased Dizziness Handicap Inventory (DHI) ( 24/100) by 18  [] 8. Independent with Home Exercise Program (HEP)  [x] 9. Demonstrate knowledge of fall prevention, issue patient education literature regarding fall prevention    Patient goals: STOP THE DIZZINESS     Rehab Potential:  [x] Good  [] Fair  [] Poor   Suggested Professional Referral:  [x] No  [] Yes:  Barriers to Goal Achievement[de-identified]  [] No  [x] Yes:MAY BE MULTI CANAL INVOLVEMENT  Domestic Concerns:  [x] No  [] Yes:      Pt. Education:    [x] Results of clinical test/Plans/Goals, Risks/Benefits discussed    [] Home exercise program  [] Fall prevention  Method of Education: [x] Verbal  [x] Demo  [] Written  Comprehension of Education:  [x] Verbalizes understanding. [x] Demonstrates understanding. [] Needs Review.       Evaluation Complexity:  History (Personal factors, comorbidities) [x] 0 [] 1-2 [] 3+   Exam (limitations, restrictions) [] 1-2 [x] 3 [] 4+   Clinical presentation (progression) [x] Stable [] Evolving  [] Unstable   Decision Making [x] Low [] Moderate [] High    [x] Low Complexity [] Moderate Complexity [] High Complexity         Treatment Plan:  [x] Vestibular Rehab [] Iontophoresis: 4 mg/mL Dexamethasone Sodium Phosphate  mAmin   70583   [x] Therapeutic Activity  16895 [] Vasopneumatic cold with compression  85304    [] Gait Training   25879 [] Ultrasound   [id number]   [] Neuromuscular Re-education  99418 [] Electrical Stimulation Unattended  16125   [] Manual Therapy  49914 [] Electrical Stimulation Attended  62413   [x] Instruction in HEP  [] Lumbar/Cervical Traction  50637   [] Aquatic Therapy   62682 [] Cold/hotpack    [] Massage   06267      [] Dry Needling, 1 or 2 muscles  66840   [x] [facility] Repositioning Maneuver   [id number] [] Therapeutic Exercise  16570     Frequency: 1 x/weeks for 2-5 visits      Todays Treatment:  Precautions: FALL RISK DUE TO FALL HISTORY  Exercises:INSTRUCTION IN HOME EXERCISE PROGRAM -CONSIDER DURING ANOTHER SESSION  Other:  CANALITH REPOSITIONING MANEUVER WITH HEAD SHAKE (MODIFIED EPLEY MANEUVER) WAS PERFORMED FOR LEFT POSTERIOR CANAL BPPV TWICE. DURING THE RETEST OF THE SECOND MANEUVER, VERY MILD, UP BEATING NYSTAGMUS WAS OBSERVED AND THE PATIENT REPORTED VERY MILD. A THIRD MANEUVER WAS NOT PERFORMED BECAUSE THE PATIENT REPORTS SHE WAS BECOMING NAUSEATED  POST MANEUVER INSTRUCTIONS WERE GIVEN INCLUDING KEEPING THE HEAD NEUTRAL FOR THE NEXT FOUR HOURS (NO LOOKING UP, BENDING OVER OR TIPPING HEAD) AND AVOID SLEEPING ON THE INVOLVED SIDE FOR 3-5 NIGHTS. THE PATIENT VERBALIZED UNDERSTANDING. THE PATIENT SAT FOR ABOUT TEN MINUTES AND THEN WAS ABLE TO WALK WITHOUT INCREASED DIFFICULTY. SHE REPORTED NAUSEA AND A MILD INCREASED FLOATING SENSATION. Specific Instructions for next treatment:       Today's Treatment Charges        Mins       Units   [x] PT Evaluation- [x] Low; [] Moderate; [] High  40 1   [x] Canalith repositioning maneuver/technique (CRM/T) 15 1   [] Therapeutic Exercise 15min     [x] Therapeutic Activity 10 1   [] Neuromuscular Reeducation     TOTAL 65 3       Start Time:  5710            Stop Time: 9618    Electronically signed by: Ab Andrew PT        Physician Signature:________________________________Date:__________________  By signing above or cosigning this note, I have reviewed this plan of care and certify a need for medically necessary rehabilitation services.      *PLEASE SIGN ABOVE AND FAX BACK ALL PAGES*

## 2022-03-08 ENCOUNTER — HOSPITAL ENCOUNTER (OUTPATIENT)
Dept: PHYSICAL THERAPY | Facility: CLINIC | Age: 72
Setting detail: THERAPIES SERIES
Discharge: HOME | End: 2022-03-08
Payer: MEDICARE

## 2022-03-08 PROCEDURE — 97530 THERAPEUTIC ACTIVITIES: CPT

## 2022-03-08 PROCEDURE — 97110 THERAPEUTIC EXERCISES: CPT

## 2022-03-08 NOTE — PROGRESS NOTES
Physical Therapy    [x] [address]  P: [phone]  F: [phone]    Physical Therapy Daily Treatment Note/DISCHARGE    Date:  3/8/2022  Patient Name:  Stanley Cole    :  1950  MRN: [id number]  Referring Provider: Joan Mckinnon DO  Insurance: Medicare, Secondary Insurance (if applicable) [address] Medicare Supp, d/remaining: [facility] on [facility],  Auth: [facility]  Medical Diagnosis/ Rehab Codes: H81.10 (ICD-10-CM) - Benign paroxysmal vertigo, unspecified ear  R42 -VERTIGO, R26.81 -IMBALANCE, R11.0 -NAUSEA  Onset date:    2022        Next 's appt.: TBD  Visit# / total visits: 2  Cancels/No Shows: 0/0    Subjective:  PATIENT REPORTS SHE HAS HAD NO BOUTS OF VERTIGO SINCE HER LAST SESSION OF PHYSICAL THERAPY. SHE DENIES VERTIGO GETTING IN AND OUT OF BED AND ROLLING. SHE STATES SHE DOES STILL EXPERIENCE IMBALANCE WHEN SHE IS IN THE DARK AND WHEN LOOKS UP. Pain:  [] Yes  [x] No Location:  Pain Rating: (0-10 scale) 0/10  Pain altered Tx:  [x] No  [] Yes  Action:      Objective:    Precautions: FALL RISK DUE TO FALL HISTORY    VESTIBULAR BPPV RE-CHECK    TEST WITHOUT FIXATION-IR GOGGLES      NYSTAGMUS DIRECTION DURATION REPORTED SYMPTOMS   SPONTANEOUS NYSTAGMUS NEGATIVE     RIGHT NORMA-HALLPIKE NO TESTED     LEFT NORMA-HALLPIKE NEG     SUPINE HEAD CENTER NEG     ROLL TEST LEFT NEG     ROLL TEST RIGHT NOT TESTED     BOW AND LEAN TEST: BOW NEG     BOW AND LEAN TEST: LEAN NOT TESTED     SIDE LYING TEST NOT TESTED           Exercises:  Exercise Reps/ Time Completed  Today Comments   4 WAY HEAD MOTION IN NARROW STAGGERED STANCE (NOD, ROTATION AND DIAGONALS) 10 EA X BID HEP   EYES CLOSED BALANCE IN NARROW STAGGERED STANCE 30\" X3 EA X BID HEP   SCAPULAR RETRACTION 5\"X10 X BID HEP               Other: PATIENT WAS EDUCATED REGARDING SAFETY AND PACING WITH HEP. PERFORM EXERCISES CLOSE TO A SUPPORTIVE SURFACE FOR SAFETY.   TRY IN INCREASE SPEED OF HEAD MOTION AS LONG AS SHE IS ABLE TO MAINTAIN POSTURAL STABILITY. Assessment:    [x] Progressing toward goals. L PC BPPV CLEARED, EXCELLENT PROGRESS TOWARDS GOALS. [] No change. [] Other:    [] Patient would continue to benefit from skilled physical therapy services in order to:     Problems:                                                   [x]? Vertigo  []? Difficulty walking straight course                                [x]? Positive Gene Hallpike/Roll Test for BPPV  []? Gait Dysfunction:                                            []?  Static balance deficit: mCTSIB  []? Dynamic balance deficit: [place] Balance Scale Zelele Piano),  []? Functional Gait Assessment (FGA)  [x]? ? Function: Dizziness Handicap Inventory [facility]):   [x]? Decreased Balance Confidence:   [x]? Other: FALL RISK, HISTORY OF FALL        Short Term Goals: (   2-5    Treatments)  [x]? 1. Patient will report 7 days with no vertigo (MET)  []? 2. Patient will report --% improvement in dizziness and --% improvement in imbalance  []? 3. Improve gait mechanics, trajectory  [x]? 4. Negative Gene Hallpike and Roll Test for BPPV (MET)  []? 5. Improved static balance  []? 6. Improved dynamic balance: increased FGA ( /30)  [x]? 7. Decreased Dizziness Handicap Inventory (DHI) ( 24/100) by 18 (NOT TESTED)  []? 8. Independent with Home Exercise Program (HEP)  [x]? 9. Demonstrate knowledge of fall prevention, issue patient education literature regarding fall prevention (MET)     Patient goals: STOP THE DIZZINESS       Pt. Education:  [x] Yes  [] No  [] Reviewed Prior HEP/Ed  Method of Education: [x] Verbal  [x] Demo  [x] Written  Comprehension of Education:  [x] Verbalizes understanding. [x] Demonstrates understanding. [] Needs review. [] Demonstrates/verbalizes HEP/Ed previously given. Plan: [] Continue per plan of care. [x] Other: PATIENT WAS ENCOURAGED TO GET BACK ON THE SCHEDULE IF HER VERTIGO RETURNS IN THE NEXT 90 DAYS.   IF SYMPTOMS RETURN

## 2023-02-19 NOTE — FLOWSHEET NOTE
Princess Fall Risk Assessment    Patient Name:  Barbara Espinosa  : 1950    Risk Factor Scale  Score   History of Falls [x] Yes- x1-2  [] No 25  0 25   Secondary Diagnosis [] Yes  [x] No 15  0 0   Ambulatory Aid [] Furniture  [] Crutches/cane/walker  [x] None/bedrest/wheelchair/nurse 30  15  0 0   IV/Heparin Lock [] Yes  [x] No 20  0 0   Gait/Transferring [] Impaired  [x] Weak  [] Normal/bedrest/immobile 20  10  0 10   Mental Status [] Forgets limitations  [x] Oriented to own ability 15  0 0      Total: 35     Based on the Assessment score: check the appropriate box.     []  No intervention needed   Low =   Score of 0-24    [x]  Use standard prevention interventions Moderate =  Score of 24-44   [x] Give patient handout and discuss fall prevention strategies   [x] Establish goal of education for patient/family RE: fall prevention strategies    []  Use high risk prevention interventions High = Score of 45 and higher   [] Give patient handout and discuss fall prevention strategies   [] Establish goal of education for patient/family Re: fall prevention strategies   [] Discuss lifeline / other resources    Electronically signed by:   Leta Holstein, PT  Date: 2020 I&O's Detail  *none doc'd

## 2023-08-01 ENCOUNTER — HOSPITAL ENCOUNTER (OUTPATIENT)
Dept: PHYSICAL THERAPY | Facility: CLINIC | Age: 73
Setting detail: THERAPIES SERIES
Discharge: HOME | End: 2023-08-01
Payer: MEDICARE

## 2023-08-01 PROCEDURE — 97161 PT EVAL LOW COMPLEX 20 MIN: CPT

## 2023-08-01 PROCEDURE — 97140 MANUAL THERAPY 1/> REGIONS: CPT

## 2023-08-01 PROCEDURE — 97110 THERAPEUTIC EXERCISES: CPT

## 2023-08-01 NOTE — CONSULTS
[] [facility] &  Therapy  [address] [facility].  [phone]  F: [phone] [] [address] [facility]  [address]   [address]  P: [phone]  F: [phone] [x] [address]  Therapy  [address] [facility]  P: [phone]  F: [phone] [] [address]  P: [phone]  F: [phone] [] [address] [facility]   [address]   [place]: [phone]  F: [phone]      Physical Therapy Lower Extremity Evaluation    Date:  2023  Patient: Alla Knowles  : 1950  MRN: [id number]  Physician: Rosalinda Rivera MD   Insurance: MEDICARE PART A AND B/MMO ( BOMN/follows primary)  Medical Diagnosis:   M70.61 (ICD-10-CM) - Trochanteric bursitis, right hip   M70.62 (ICD-10-CM) - Trochanteric bursitis, left hip   M76.31 (ICD-10-CM) - Iliotibial band syndrome, right leg   M76.32 (ICD-10-CM) - Iliotibial band syndrome, left leg     Rehab Codes: M25.551, M25.552, R26.89  Onset date: 23  Next 's appt.: 23    Subjective:   CC: Years of pain. Had B knees replaced. The hips have just progressively getting worse. In the last few months it has been worse. Spouse is getting dementia so doing more with maintenance around the house. Have been sitting more due to pain.  R knee has always had some swelling just below the knee    PMHx: [] Unremarkable [] Diabetes [] HTN  [] Pacemaker   [] MI/Heart Problems [] Cancer [] Arthritis [] Other:              [x] Refer to full medical chart  In EPIC       Comorbidities:   [x] Obesity [] Dialysis  [] N/A   [] Asthma/COPD [] Dementia [] Other:   [] Stroke [] Sleep apnea [] Other:   [] Vascular disease [] Rheumatic disease [] Other:     Tests: [x] X-Ray: show arthritis  [] MRI:  [] Other:    Medications: [x] Refer

## 2023-08-02 NOTE — PRE-CERTIFICATION NOTE
Medicare Cap   [] Physical Therapy  [] Speech Therapy  [] Occupational therapy  *PT and Speech caps combine      $2230 Limit for PT and Speech combined  $2230 Limit for OT individually  At the beginning of the month where you expect to go over $2230, please add the [address] [facility] modifier      Patient Name: Imtiaz Deleon: 1950    Note:  This is an estimate of charges billed.      Date of [facility] [facility] Name # units/ charge $$$ charge Daily Total Charge Ongoing Total $$$   8/1/23 Farheenal, man, ex 3 95.95+20.32+21.75 138.02 138.02

## 2023-08-02 NOTE — CARE COORDINATION
Princess Fall Risk Assessment    Patient Name:  Nahum Siddiqui  : 1950    Risk Factor Scale  Score   History of Falls [] Yes  [x] No 25  0    Secondary Diagnosis [] Yes  [x] No 15  0    Ambulatory Aid [] Furniture  [] Crutches/cane/walker  [x] None/bedrest/wheelchair/nurse 30  15  0    IV/Heparin Lock [] Yes  [x] No 20  0    Gait/Transferring [] Impaired  [x] Weak  [] Normal/bedrest/immobile 20  10  0 10   Mental Status [] Forgets limitations  [x] Oriented to own ability 15  0       Total:10     Based on the Assessment score: check the appropriate box.     [x]  No intervention needed   Low =   Score of 0-24    []  Use standard prevention interventions Moderate =  Score of 24-44   [] Give patient handout and discuss fall prevention strategies   [] Establish goal of education for patient/family RE: fall prevention strategies    []  Use high risk prevention interventions High = Score of 45 and higher   [] Give patient handout and discuss fall prevention strategies   [] Establish goal of education for patient/family Re: fall prevention strategies   [] Discuss lifeline / other resources    Electronically signed by:   Promise Cleary, PT  Date: 2023

## 2023-08-03 ENCOUNTER — HOSPITAL ENCOUNTER (OUTPATIENT)
Dept: PHYSICAL THERAPY | Facility: CLINIC | Age: 73
Setting detail: THERAPIES SERIES
Discharge: HOME | End: 2023-08-03
Payer: MEDICARE

## 2023-08-03 PROCEDURE — 97140 MANUAL THERAPY 1/> REGIONS: CPT

## 2023-08-03 PROCEDURE — 97110 THERAPEUTIC EXERCISES: CPT

## 2023-08-03 NOTE — FLOWSHEET NOTE
[x] [address] [place] [address]   Outpatient Rehabilitation &  Therapy  [address] [place] [address] Rd  P: [phone]  F: [phone]     Physical Therapy Daily Treatment Note    Date:  8/3/2023  Patient Name:  Kevin Alvarez    :  1950  MRN: [id number]  Physician: Fabiano Schilling MD                          Insurance: MEDICARE PART A AND B/MMO ( BOMN/follows primary)  Medical Diagnosis:   M70.61 (ICD-10-CM) - Trochanteric bursitis, right hip   M70.62 (ICD-10-CM) - Trochanteric bursitis, left hip   M76.31 (ICD-10-CM) - Iliotibial band syndrome, right leg   M76.32 (ICD-10-CM) - Iliotibial band syndrome, left leg                           Rehab Codes: M25.551, M25.552, R26.89  Onset date: 23                 Next 's appt.: 23       Visit# / total visits:      Cancels/No Shows:     Subjective:    Pain:  [x] Yes  [] No Location: B hip  Pain Rating: (0-10 scale) 2/10  Pain altered Tx:  [x] No  [] Yes  Action:  Comments: Pt mentioned being really sore today from trying to correct her posture with walking. Objective:  Modalities: prn  Manual: , Hypervolt glute med, piriformis, lTFL and vastus lateralis  Precautions: standard  Exercises:  Exercise Reps/ Time Weight/ Level Comments    SciFit 10m   x   Calf/HS stretch 30\"x3   x          Hip flexor stetch off EOB 2x1'   Off side of bed x   Fig 4 stretch [id number]     x   Supine          clamshell 2x10 lime   x   bridge 2x10     x   Other:     Specific Instructions for next treatment:Nustep, progress hip strengthening and mobility    Treatment Charges: Mins Units   []  Modalities     [x]  Ther Exercise 30 2   [x]  Manual Therapy 15 1   []  Ther Activities     []  Aquatics     []  Vasocompression     []  Other     Total Treatment time 45 3       Assessment: [x] Progressing toward goals. [] No change. [x] Other: Initiated session with warm up on scifit to help increased blood flow and loosen up tight mm.  Began working on standing stretches followed with

## 2023-08-08 ENCOUNTER — HOSPITAL ENCOUNTER (OUTPATIENT)
Dept: PHYSICAL THERAPY | Facility: CLINIC | Age: 73
Setting detail: THERAPIES SERIES
Discharge: HOME | End: 2023-08-08
Payer: MEDICARE

## 2023-08-08 PROCEDURE — 97110 THERAPEUTIC EXERCISES: CPT

## 2023-08-08 NOTE — FLOWSHEET NOTE
[x] [address] [place] [address]   Outpatient Rehabilitation &  Therapy  [address] [facility]  P: [phone]  F: [phone]     Physical Therapy Daily Treatment Note    Date:  2023  Patient Name:  Celia Peoples    :  1950  MRN: [id number]  Physician: Miranda Zamorano MD                          Insurance: MEDICARE PART A AND B/MMO ( BOMN/follows primary)  Medical Diagnosis:   M70.61 (ICD-10-CM) - Trochanteric bursitis, right hip   M70.62 (ICD-10-CM) - Trochanteric bursitis, left hip   M76.31 (ICD-10-CM) - Iliotibial band syndrome, right leg   M76.32 (ICD-10-CM) - Iliotibial band syndrome, left leg                           Rehab Codes: M25.551, M25.552, R26.89  Onset date: 23                 Next 's appt.: 23       Visit# / total visits: 3/20     Cancels/No Shows:     Subjective:    Pain:  [x] Yes  [] No Location: B hip  Pain Rating: (0-10 scale) 2/10  Pain altered Tx:  [x] No  [] Yes  Action:  Comments: Pt mentioned that she has been really sore lately. Objective:  Modalities: prn  Manual:  Hypervolt glute med, piriformis, lTFL and vastus lateralis  Precautions: standard  Exercises:  Exercise Reps/ Time Weight/ Level Comments    SciFit 10m   x   Calf/HS stretch 30\"x3   x          Hip flexor stetch off EOB 2x1'   Off side of bed x   Fig 4 stretch [id number]     x   Supine          clamshell 2x10 lime      bridge 2x10               TA sets:supine, seated next                    Other:     Specific Instructions for next treatment:     Treatment Charges: Mins Units   [x]  Modalities: HP 15 0   [x]  Ther Exercise 30 2   []  Manual Therapy      []  Ther Activities     []  Aquatics     []  Vasocompression     []  Other     Total Treatment time 30 2       Assessment: [x] Progressing toward goals. [] No change. [x] Other: Pt with increased pain down posterior chain on L LE along with tightness in LB.  Upon discussion and pt example of postural control with walking pt has been pushing to

## 2023-08-11 ENCOUNTER — HOSPITAL ENCOUNTER (OUTPATIENT)
Dept: PHYSICAL THERAPY | Facility: CLINIC | Age: 73
Setting detail: THERAPIES SERIES
Discharge: HOME | End: 2023-08-11
Payer: MEDICARE

## 2023-08-11 PROCEDURE — 97110 THERAPEUTIC EXERCISES: CPT

## 2023-08-11 NOTE — FLOWSHEET NOTE
[x] [address] [place] [address]   Outpatient Rehabilitation &  Therapy  [address] [place] [address] Rd  P: [phone]  F: [phone]     Physical Therapy Daily Treatment Note    Date:  2023  Patient Name:  Windsor Sicard    :  1950  MRN: [id number]  Physician: Xena Sim MD                          Insurance: MEDICARE PART A AND B/MMO ( BOMN/follows primary)  Medical Diagnosis:   M70.61 (ICD-10-CM) - Trochanteric bursitis, right hip   M70.62 (ICD-10-CM) - Trochanteric bursitis, left hip   M76.31 (ICD-10-CM) - Iliotibial band syndrome, right leg   M76.32 (ICD-10-CM) - Iliotibial band syndrome, left leg                           Rehab Codes: M25.551, M25.552, R26.89  Onset date: 23                 Next 's appt.: 23       Visit# / total visits:      Cancels/No Shows:     Subjective:    Pain:  [x] Yes  [] No Location: B hip  Pain Rating: (0-10 scale) 4/10  Pain altered Tx:  [x] No  [] Yes  Action:  Comments: Pt mentions feeling much better compared to previous session. Took a couple days off from exercises. Objective:  Modalities: prn  Manual:  prn  Precautions: standard  Exercises:  Exercise Reps/ Time Weight/ Level Comments    SciFit 5m   x   Calf/HS stretch 3x30\"   x   Step Hip flexor stretch 3x30\"   x          Mat          Fig 4 stretch 3x30\"  Towel x   Glut sets 10X10\"   x   Bridges 2x10   x   TA sets 2x10, 5\"   x   TA marches x20   x   Supine Clamshell 2x10 lime   x   SL hip abduction 2x10   x                        Other:     Specific Instructions for next treatment:     Treatment Charges: Mins Units   []  Modalities: HP     [x]  Ther Exercise 40 3   []  Manual Therapy     []  Ther Activities     []  Aquatics     []  Vasocompression     []  Other     Total Treatment time 40 3       Assessment: [x] Progressing toward goals. [] No change. [x] Other: Scifit warm up followed by stretches.  Modified hip flexor stretch to standing with stool as opposed to EOB with improved

## 2023-08-11 NOTE — PRE-CERTIFICATION NOTE
Medicare Cap   [] Physical Therapy  [] Speech Therapy  [] Occupational therapy  *PT and Speech caps combine      $2230 Limit for PT and Speech combined  $2230 Limit for OT individually  At the beginning of the month where you expect to go over $2230, please add the [facility] modifier      Patient Name: Sanna Handy: 1950    Note:  This is an estimate of charges billed.      Date of 705 [facility] Name # units/ charge $$$ charge Daily Total Charge Ongoing Total $$$   8/1/23 María, man, ex 3 95.95+20.32+21.75 138.02 138.02   8/3 2 ex, man  24.77+19.15+17.89 61.81 199.83   8/8 2 ex  24.77+19.15 43.92 243.75   8/11 3 ex  24.77+19.15+19.15 63.07 306.82

## 2023-08-15 ENCOUNTER — HOSPITAL ENCOUNTER (OUTPATIENT)
Dept: PHYSICAL THERAPY | Facility: CLINIC | Age: 73
Setting detail: THERAPIES SERIES
Discharge: HOME | End: 2023-08-15
Payer: MEDICARE

## 2023-08-15 PROCEDURE — 97110 THERAPEUTIC EXERCISES: CPT

## 2023-08-15 NOTE — FLOWSHEET NOTE
[x] [address] [place] [address]   Outpatient Rehabilitation &  Therapy  [address] [place] [address] Rd  P: [phone]  F: [phone]     Physical Therapy Daily Treatment Note    Date:  8/15/2023  Patient Name:  Yelena Landa    :  1950  MRN: [id number]  Physician: Brayan Epstein MD                          Insurance: MEDICARE PART A AND B/MMO ( BOMN/follows primary)  Medical Diagnosis:   M70.61 (ICD-10-CM) - Trochanteric bursitis, right hip   M70.62 (ICD-10-CM) - Trochanteric bursitis, left hip   M76.31 (ICD-10-CM) - Iliotibial band syndrome, right leg   M76.32 (ICD-10-CM) - Iliotibial band syndrome, left leg                           Rehab Codes: M25.551, M25.552, R26.89  Onset date: 23                 Next 's appt.: 23       Visit# / total visits:      Cancels/No Shows:     Subjective:    Pain:  [x] Yes  [] No Location: B hip  Pain Rating: (0-10 scale) 4/10  Pain altered Tx:  [x] No  [] Yes  Action:  Comments: Pt stated that she was gardening and weeding over the weekend and \"tweeked\" the R side of her LB. Objective:  Modalities: prn  Manual:  prn  Precautions: standard  Exercises:  Exercise Reps/ Time Weight/ Level Comments    SciFit 5m   x   Calf/HS stretch 3x30\"   x   Step Hip flexor stretch 3x30\"   x          Mat          Fig 4 stretch 3x30\"  Towel x   LTR 10x10\"   x   Glut sets 10X10\"   x   Bridges 2x10   x   TA sets 2x10, 5\"   x   TA marches x20   x   3-way Clamshell 2x10 Lime    x   SL hip abduction 2x10                           Other:     Specific Instructions for next treatment:     Treatment Charges: Mins Units   []  Modalities: HP     [x]  Ther Exercise 40 3   []  Manual Therapy     []  Ther Activities     []  Aquatics     []  Vasocompression     []  Other     Total Treatment time 40 3       Assessment: [x] Progressing toward goals. [] No change. [x] Other:   Pt able to continue with warm up followed by standing stretches.  Mod verbal cueing for exercise recall and Continue IVF for hydration. STAT CBC. continue to monitor.

## 2023-08-17 ENCOUNTER — APPOINTMENT (OUTPATIENT)
Dept: PHYSICAL THERAPY | Facility: CLINIC | Age: 73
End: 2023-08-17
Payer: MEDICARE

## 2023-08-18 ENCOUNTER — HOSPITAL ENCOUNTER (OUTPATIENT)
Dept: PHYSICAL THERAPY | Facility: CLINIC | Age: 73
Setting detail: THERAPIES SERIES
Discharge: HOME | End: 2023-08-18
Payer: MEDICARE

## 2023-08-18 PROCEDURE — 97110 THERAPEUTIC EXERCISES: CPT

## 2023-08-18 NOTE — FLOWSHEET NOTE
Pt did fatigue and required min verbal cueing for corrective body mechanics and posturing. Finished with exercises on the mat table to continue working on increasing hip stability. Pt. Education:  [x] Yes  [] No  [x] Reviewed Prior HEP/Ed  Method of Education: [x] Verbal  [x] Demo  [] Written  Access Code: [email or web address]  URL: [email or web address]  Date: 08/11/2023  Prepared by: Amara Williamson    Exercises  - Hip Flexor Stretch on Step  - 1 x daily - 7 x weekly - 3 sets - 30\" hold  - Supine Figure 4 Piriformis Stretch  - 2 x daily - 7 x weekly - 3 sets - 30 sec hold  - Pelvic Floor Contractions in Hooklying with Resisted Abduction  - 1 x daily - 7 x weekly - 3 sets - 10 reps  - Hooklying Gluteal Sets  - 1 x daily - 7 x weekly - 10 reps - 10\" hold  - Supine Bridge with Pelvic Floor Contraction  - 1 x daily - 7 x weekly - 3 sets - 10 reps  - Sidelying Hip Abduction  - 1 x daily - 7 x weekly - 3 sets - 10 reps  - Supine Lower Trunk Rotation  - 1 x daily - 7 x weekly - 10 reps - 10\" hold  Comprehension of Education:  [x] Verbalizes understanding. [x] Demonstrates understanding. [] Needs review. [] Demonstrates/verbalizes HEP/Ed previously given. Plan: [x] Continue with current plan of care towards goals.       Time In: 9:00 am            Time Out: 9:50 am     Electronically signed by:  Beverly Lopez PTA

## 2023-09-01 ENCOUNTER — HOSPITAL ENCOUNTER (OUTPATIENT)
Dept: PHYSICAL THERAPY | Facility: CLINIC | Age: 73
Setting detail: THERAPIES SERIES
Discharge: HOME | End: 2023-09-01
Payer: MEDICARE

## 2023-09-01 PROCEDURE — 97110 THERAPEUTIC EXERCISES: CPT

## 2023-09-01 NOTE — PRE-CERTIFICATION NOTE
Medicare Cap   [] Physical Therapy  [] Speech Therapy  [] Occupational therapy  *PT and Speech caps combine      $2230 Limit for PT and Speech combined  $2230 Limit for OT individually  At the beginning of the month where you expect to go over $2230, please add the [facility] modifier      Patient Name: Arthur Fernandez: 1950    Note:  This is an estimate of charges billed.      Date of 705 [facility] Name # units/ charge $$$ charge Daily Total Charge Ongoing Total $$$   8/1/23 María, man, ex 3 95.95+20.32+21.75 138.02 138.02   8/3 2 ex, man  24.77+19.15+17.89 61.81 199.83   8/8 2 ex  24.77+19.15 43.92 243.75   8/11 3 ex  24.77+19.15+19.15 63.07 306.82   8/15 3ex   63.07 369.89   8/18 3ex   63.07 432.98   9/1 3ex   63.07 496.03

## 2023-09-01 NOTE — FLOWSHEET NOTE
[x] [address] [place] [address]   Outpatient Rehabilitation &  Therapy  [address] [place] [address] Rd  P: [phone]  F: [phone]     Physical Therapy Daily Treatment Note    Date:  2023  Patient Name:  Ananda Schrader    :  1950  MRN: [id number]  Physician: Leslee Alarcon MD                          Insurance: MEDICARE PART A AND B/MMO ( BOMN/follows primary)  Medical Diagnosis:   M70.61 (ICD-10-CM) - Trochanteric bursitis, right hip   M70.62 (ICD-10-CM) - Trochanteric bursitis, left hip   M76.31 (ICD-10-CM) - Iliotibial band syndrome, right leg   M76.32 (ICD-10-CM) - Iliotibial band syndrome, left leg                           Rehab Codes: M25.551, M25.552, R26.89  Onset date: 23                 Next 's appt.: 23       Visit# / total visits:      Cancels/No Shows:     Subjective:    Pain:  [x] Yes  [] No Location: B hip  Pain Rating: (0-10 scale) 1/10  Pain altered Tx:  [x] No  [] Yes  Action:  Comments: Pt stated that she did a lot of walking on an incline and her L knee has been sore. Objective:  Modalities: prn  Manual:  prn  Precautions: standard  Exercises:  Exercise Reps/ Time Weight/ Level Comments    SciFit 10m   x   Calf/HS stretch 3x30\"   x   Step Hip flexor stretch 3x30\"   x          Mat          Fig 4 stretch 3x30\"  Towel    LTR 10x10\"      Glut sets 10X10\"      Bridges 2x10   xx   TA sets 2x10, 5\"   xx   TA marches x20   xx   3-way Clamshell 2x10 Lime  Band only with supine xx   SL hip abduction 2x10   x          // Bars       3-way hip 15x2   xx   Other:     Specific Instructions for next treatment:     Treatment Charges: Mins Units   []  Modalities: HP     [x]  Ther Exercise 40 3   []  Manual Therapy     []  Ther Activities     []  Aquatics     []  Vasocompression     []  Other     Total Treatment time 40 3       Assessment: [x] Progressing toward goals. [] No change. [x] Other: Pt completed warm up and standing stretches.  Began exercises with standing 3-way

## 2023-09-06 ENCOUNTER — HOSPITAL ENCOUNTER (OUTPATIENT)
Dept: PHYSICAL THERAPY | Facility: CLINIC | Age: 73
Setting detail: THERAPIES SERIES
Discharge: HOME | End: 2023-09-06
Payer: MEDICARE

## 2023-09-06 PROCEDURE — 97110 THERAPEUTIC EXERCISES: CPT

## 2023-09-06 NOTE — FLOWSHEET NOTE
[x] [address] [place] [address]   Outpatient Rehabilitation &  Therapy  [address] [place] [address] Rd  P: [phone]  F: [phone]     Physical Therapy Daily Treatment Note    Date:  2023  Patient Name:  Mima Bragg    :  1950  MRN: [id number]  Physician: Lesly Tavares MD                          Insurance: MEDICARE PART A AND B/MMO ( BOMN/follows primary)  Medical Diagnosis:   M70.61 (ICD-10-CM) - Trochanteric bursitis, right hip   M70.62 (ICD-10-CM) - Trochanteric bursitis, left hip   M76.31 (ICD-10-CM) - Iliotibial band syndrome, right leg   M76.32 (ICD-10-CM) - Iliotibial band syndrome, left leg                           Rehab Codes: M25.551, M25.552, R26.89  Onset date: 23                 Next 's appt.: 23       Visit# / total visits:      Cancels/No Shows:     Subjective:    Pain:  [x] Yes  [] No Location: B hip  Pain Rating: (0-10 scale) 0/10  Pain altered Tx:  [x] No  [] Yes  Action:  Comments: Pt mentioned that she was feeling really good today. Objective:  Modalities: prn  Manual:  prn  Precautions: standard  Exercises:  Exercise Reps/ Time Weight/ Level Comments    SciFit 10m   x   Calf/HS stretch 3x30\"   x   Step Hip flexor stretch 3x30\"   x          Mat          Bridges 3x10 Lime   x   TA sets 2x10, 5\"      TA marches x20      3-way Clamshell 3x10 Lime    x   SL hip abduction 2x10   x          // Bars       3-way hip 15x2 2#  x   March  20x 2#  x   HS Curl 20x 2#  x          Other:     Specific Instructions for next treatment:     Treatment Charges: Mins Units   []  Modalities: HP     [x]  Ther Exercise 40 3   []  Manual Therapy     []  Ther Activities     []  Aquatics     []  Vasocompression     []  Other     Total Treatment time 40 3       Assessment: [x] Progressing toward goals. [] No change. [x] Other: Pt able to be progressed in strengthening and resistance today in both standing and table exercises.  Provided pt with 2# ankle weights for all standing

## 2023-09-13 ENCOUNTER — HOSPITAL ENCOUNTER (OUTPATIENT)
Dept: PHYSICAL THERAPY | Facility: CLINIC | Age: 73
Setting detail: THERAPIES SERIES
Discharge: HOME | End: 2023-09-13
Payer: MEDICARE

## 2023-09-13 PROCEDURE — 97110 THERAPEUTIC EXERCISES: CPT

## 2023-09-13 NOTE — FLOWSHEET NOTE
[x] [address] [place] [address]   Outpatient Rehabilitation &  Therapy  [address] [place] [address] Rd  P: [phone]  F: [phone]     Physical Therapy Daily Treatment Note    Date:  2023  Patient Name:  Daniel Gary    :  1950  MRN: [id number]  Physician: Adrianna Shaw MD                          Insurance: MEDICARE PART A AND B/MMO ( BOMN/follows primary)  Medical Diagnosis:   M70.61 (ICD-10-CM) - Trochanteric bursitis, right hip   M70.62 (ICD-10-CM) - Trochanteric bursitis, left hip   M76.31 (ICD-10-CM) - Iliotibial band syndrome, right leg   M76.32 (ICD-10-CM) - Iliotibial band syndrome, left leg                           Rehab Codes: M25.551, M25.552, R26.89  Onset date: 23                 Next 's appt.: 23       Visit# / total visits:      Cancels/No Shows:     Subjective:    Pain:  [x] Yes  [] No Location: B hip  Pain Rating: (0-10 scale) 0/10  Pain altered Tx:  [x] No  [] Yes  Action:  Comments: Pt stated that she feels like she is getting stronger. Objective:  Modalities: prn  Manual:  prn  Precautions: standard  Exercises:  Exercise Reps/ Time Weight/ Level Comments    SciFit 10m   x   Calf/HS stretch 3x30\"   x   Step Hip flexor stretch 3x30\"   x          Mat          Bridges 3x10 Lime   x   TA sets 2x10, 5\"      TA marches x20      3-way Clamshell 3x10 Lime    x   SL hip abduction 2x10   x          // Bars       3-way hip 15x2 Lime    x   March  20x   x   HS Curl 20x   x          Other:     Specific Instructions for next treatment:     Treatment Charges: Mins Units   []  Modalities: HP     [x]  Ther Exercise 40 3   []  Manual Therapy     []  Ther Activities     []  Aquatics     []  Vasocompression     []  Other     Total Treatment time 40 3       Assessment: [x] Progressing toward goals. [] No change. [x] Other: Pt able to be progressed with resistance today with the addition of the lime band for standing hip exercises.  Pt does require utilization of bilateral UE

## 2023-09-15 ENCOUNTER — HOSPITAL ENCOUNTER (OUTPATIENT)
Age: 73
Setting detail: SPECIMEN
Discharge: HOME | End: 2023-09-15

## 2023-09-15 ENCOUNTER — HOSPITAL ENCOUNTER (OUTPATIENT)
Age: 73
End: 2023-09-15
Payer: MEDICARE

## 2023-09-15 DIAGNOSIS — R06.00 DYSPNEA, UNSPECIFIED TYPE: ICD-10-CM

## 2023-09-15 LAB
BASOPHILS # BLD: 0.04 K/UL (ref 0–0.2)
BASOPHILS NFR BLD: 1 % (ref 0–2)
EOSINOPHIL # BLD: 0.08 K/UL (ref 0–0.44)
EOSINOPHILS RELATIVE PERCENT: 1 % (ref 1–4)
ERYTHROCYTE [DISTWIDTH] IN BLOOD BY AUTOMATED COUNT: 12.4 % (ref 11.8–14.4)
HCT VFR BLD AUTO: 42 % (ref 36.3–47.1)
HGB BLD-MCNC: 13.2 G/DL (ref 11.9–15.1)
IMM GRANULOCYTES # BLD AUTO: <0.03 K/UL (ref 0–0.3)
IMM GRANULOCYTES NFR BLD: 0 %
LYMPHOCYTES NFR BLD: 2.02 K/UL (ref 1.1–3.7)
LYMPHOCYTES RELATIVE PERCENT: 28 % (ref 24–43)
MCH RBC QN AUTO: 29.4 PG (ref 25.2–33.5)
MCHC RBC AUTO-ENTMCNC: 31.4 G/DL (ref 28.4–34.8)
MCV RBC AUTO: 93.5 FL (ref 82.6–102.9)
MONOCYTES NFR BLD: 0.75 K/UL (ref 0.1–1.2)
MONOCYTES NFR BLD: 10 % (ref 3–12)
NEUTROPHILS NFR BLD: 60 % (ref 36–65)
NEUTS SEG NFR BLD: 4.32 K/UL (ref 1.5–8.1)
NRBC BLD-RTO: 0 PER 100 WBC
PLATELET # BLD AUTO: 204 K/UL (ref 138–453)
PMV BLD AUTO: 12.2 FL (ref 8.1–13.5)
RBC # BLD AUTO: 4.49 M/UL (ref 3.95–5.11)
VIT B12 SERPL-MCNC: 750 PG/ML (ref 232–1245)
WBC OTHER # BLD: 7.2 K/UL (ref 3.5–11.3)

## 2023-09-15 PROCEDURE — 93306 TTE W/DOPPLER COMPLETE: CPT

## 2023-09-16 LAB
ALBUMIN SERPL-MCNC: 4.1 G/DL (ref 3.5–5.2)
ALBUMIN/GLOB SERPL: 1.4 {RATIO} (ref 1–2.5)
ALP SERPL-CCNC: 68 U/L (ref 35–104)
ALT SERPL-CCNC: 12 U/L (ref 5–33)
ANION GAP SERPL CALCULATED.3IONS-SCNC: 11 MMOL/L (ref 9–17)
AST SERPL-CCNC: 17 U/L
BILIRUB SERPL-MCNC: 0.2 MG/DL (ref 0.3–1.2)
BUN SERPL-MCNC: 15 MG/DL (ref 8–23)
CALCIUM SERPL-MCNC: 9.4 MG/DL (ref 8.6–10.4)
CHLORIDE SERPL-SCNC: 104 MMOL/L (ref 98–107)
CHOLEST SERPL-MCNC: 161 MG/DL
CHOLESTEROL/HDL RATIO: 2.9
CO2 SERPL-SCNC: 26 MMOL/L (ref 20–31)
CREAT SERPL-MCNC: 0.7 MG/DL (ref 0.5–0.9)
ECHO AO ROOT DIAM: 3 CM
ECHO AV PEAK GRADIENT: 6 MMHG
ECHO AV PEAK VELOCITY: 1.3 M/S
ECHO EST RA PRESSURE: 3 MMHG
ECHO LA AREA 4C: 13.2 CM2
ECHO LA DIAMETER: 3.4 CM
ECHO LA MAJOR AXIS: 4.7 CM
ECHO LA TO AORTIC ROOT RATIO: 1.13
ECHO LA VOL 4C: 30 ML (ref 22–52)
ECHO LV E' LATERAL VELOCITY: 8 CM/S
ECHO LV E' SEPTAL VELOCITY: 6 CM/S
ECHO LV FRACTIONAL SHORTENING: 46 % (ref 28–44)
ECHO LV INTERNAL DIMENSION DIASTOLIC: 3.9 CM (ref 3.9–5.3)
ECHO LV INTERNAL DIMENSION SYSTOLIC: 2.1 CM
ECHO LV IVSD: 0.9 CM (ref 0.6–0.9)
ECHO LV MASS 2D: 89.7 G (ref 67–162)
ECHO LV POSTERIOR WALL DIASTOLIC: 0.7 CM (ref 0.6–0.9)
ECHO LV RELATIVE WALL THICKNESS RATIO: 0.36
ECHO MV A VELOCITY: 1.16 M/S
ECHO MV E DECELERATION TIME (DT): 173 MS
ECHO MV E VELOCITY: 0.86 M/S
ECHO MV E/A RATIO: 0.74
ECHO MV E/E' LATERAL: 10.75
ECHO MV E/E' RATIO (AVERAGED): 12.54
ECHO MV E/E' SEPTAL: 14.33
ECHO RIGHT VENTRICULAR SYSTOLIC PRESSURE (RVSP): 24 MMHG
ECHO TV REGURGITANT MAX VELOCITY: 2.27 M/S
ECHO TV REGURGITANT PEAK GRADIENT: 21 MMHG
GFR SERPL CREATININE-BSD FRML MDRD: >60 ML/MIN/1.73M2
GLUCOSE SERPL-MCNC: 121 MG/DL (ref 70–99)
HDLC SERPL-MCNC: 56 MG/DL
LDLC SERPL CALC-MCNC: 75 MG/DL (ref 0–130)
MAGNESIUM SERPL-MCNC: 2.1 MG/DL (ref 1.6–2.6)
POTASSIUM SERPL-SCNC: 4.4 MMOL/L (ref 3.7–5.3)
PROT SERPL-MCNC: 7 G/DL (ref 6.4–8.3)
SODIUM SERPL-SCNC: 141 MMOL/L (ref 135–144)
T4 FREE SERPL-MCNC: 1.1 NG/DL (ref 0.9–1.7)
TRIGL SERPL-MCNC: 150 MG/DL
TSH SERPL DL<=0.05 MIU/L-ACNC: 1.63 UIU/ML (ref 0.3–5)

## 2023-09-19 ENCOUNTER — HOSPITAL ENCOUNTER (OUTPATIENT)
Dept: PHYSICAL THERAPY | Facility: CLINIC | Age: 73
Setting detail: THERAPIES SERIES
Discharge: HOME | End: 2023-09-19
Payer: MEDICARE

## 2023-09-19 PROCEDURE — 97110 THERAPEUTIC EXERCISES: CPT

## 2023-09-19 NOTE — FLOWSHEET NOTE
[x] [address] [place] [address]   Outpatient Rehabilitation &  Therapy  [address] [place] [address] Rd  P: [phone]  F: [phone]     Physical Therapy Daily Treatment Note    Date:  2023  Patient Name:  Kassi Erickson    :  1950  MRN: [id number]  Physician: Jaswant Hardwick MD                          Insurance: MEDICARE PART A AND B/MMO ( BOMN/follows primary)  Medical Diagnosis:   M70.61 (ICD-10-CM) - Trochanteric bursitis, right hip   M70.62 (ICD-10-CM) - Trochanteric bursitis, left hip   M76.31 (ICD-10-CM) - Iliotibial band syndrome, right leg   M76.32 (ICD-10-CM) - Iliotibial band syndrome, left leg                           Rehab Codes: M25.551, M25.552, R26.89  Onset date: 23                 Next 's appt.: 23       Visit# / total visits: 10/20     Cancels/No Shows:     Subjective:    Pain:  [x] Yes  [] No Location: B hip  Pain Rating: (0-10 scale) 0/10  Pain altered Tx:  [x] No  [] Yes  Action:  Comments: Pt mentioned that she is feeling good and has been doing a lot of weeding and running around this morning. Objective:  Modalities: prn  Manual:  prn  Precautions: standard  Exercises:  Exercise Reps/ Time Weight/ Level Comments    SciFit 10m      Calf/HS stretch 3x30\"      Step Hip flexor stretch 3x30\"             Mat          Bridges 2x15 Lime      TA sets 2x10, 5\"      TA marches x20      3-way Clamshell 2x15 Lime       SL hip abduction 2x15             // Bars       3-way hip 15x2 Lime       Step ups 2x10 8\"            Palloff Press 20x Lime                    Other:     Specific Instructions for next treatment:     Treatment Charges: Mins Units   []  Modalities: HP     [x]  Ther Exercise 40 3   []  Manual Therapy     []  Ther Activities     []  Aquatics     []  Vasocompression     []  Other     Total Treatment time 40 3       Assessment: [x] Progressing toward goals. [] No change. [x] Other: Pt with no issues during the warm up or standing stretches.  Continued working

## 2023-09-20 ENCOUNTER — OFFICE VISIT (OUTPATIENT)
Age: 73
End: 2023-09-20

## 2023-09-20 VITALS
HEART RATE: 90 BPM | BODY MASS INDEX: 42.68 KG/M2 | OXYGEN SATURATION: 96 % | HEIGHT: 64 IN | WEIGHT: 250 LBS | RESPIRATION RATE: 14 BRPM | DIASTOLIC BLOOD PRESSURE: 92 MMHG | SYSTOLIC BLOOD PRESSURE: 138 MMHG | TEMPERATURE: 97.2 F

## 2023-09-20 DIAGNOSIS — R06.09 DYSPNEA ON EXERTION: Primary | ICD-10-CM

## 2023-09-20 DIAGNOSIS — E66.01 OBESITY, CLASS III, BMI 40-49.9 (MORBID OBESITY) (HCC): ICD-10-CM

## 2023-09-20 DIAGNOSIS — F41.9 ANXIETY: ICD-10-CM

## 2023-09-20 PROBLEM — I10 HTN (HYPERTENSION), BENIGN: Status: ACTIVE | Noted: 2021-04-14

## 2023-09-20 PROBLEM — M17.12 PRIMARY OSTEOARTHRITIS OF LEFT KNEE: Status: ACTIVE | Noted: 2018-04-04

## 2023-09-20 PROBLEM — Z96.652 HISTORY OF TOTAL LEFT KNEE REPLACEMENT: Status: ACTIVE | Noted: 2021-04-14

## 2023-09-20 PROBLEM — I82.562 CHRONIC DEEP VEIN THROMBOSIS (DVT) OF CALF MUSCLE VEIN OF LEFT LOWER EXTREMITY (HCC): Status: ACTIVE | Noted: 2021-04-14

## 2023-09-20 PROBLEM — G89.18 POST-OP PAIN: Status: ACTIVE | Noted: 2018-04-04

## 2023-09-20 PROBLEM — I83.93 VARICOSE VEINS OF BOTH LOWER EXTREMITIES: Status: ACTIVE | Noted: 2021-04-14

## 2023-09-20 PROBLEM — M54.81 OCCIPITAL NEURALGIA: Status: ACTIVE | Noted: 2021-04-14

## 2023-09-20 PROBLEM — E55.9 VITAMIN D DEFICIENCY: Status: ACTIVE | Noted: 2021-04-14

## 2023-09-20 PROBLEM — H93.13 BILATERAL TINNITUS: Status: ACTIVE | Noted: 2021-04-14

## 2023-09-20 PROBLEM — D36.9 TUBULAR ADENOMA: Status: ACTIVE | Noted: 2021-04-14

## 2023-09-20 PROBLEM — I80.9 SUPERFICIAL THROMBOPHLEBITIS: Status: ACTIVE | Noted: 2020-11-01

## 2023-09-20 PROBLEM — E78.2 MIXED HYPERLIPIDEMIA: Status: ACTIVE | Noted: 2021-04-14

## 2023-09-20 RX ORDER — LISINOPRIL 20 MG/1
20 TABLET ORAL DAILY
COMMUNITY
Start: 2021-03-25

## 2023-09-20 RX ORDER — LISINOPRIL 20 MG/1
TABLET ORAL
COMMUNITY
Start: 2023-09-17

## 2023-09-20 SDOH — ECONOMIC STABILITY: FOOD INSECURITY: WITHIN THE PAST 12 MONTHS, YOU WORRIED THAT YOUR FOOD WOULD RUN OUT BEFORE YOU GOT MONEY TO BUY MORE.: NEVER TRUE

## 2023-09-20 SDOH — ECONOMIC STABILITY: FOOD INSECURITY: WITHIN THE PAST 12 MONTHS, THE FOOD YOU BOUGHT JUST DIDN'T LAST AND YOU DIDN'T HAVE MONEY TO GET MORE.: NEVER TRUE

## 2023-09-20 SDOH — ECONOMIC STABILITY: INCOME INSECURITY: HOW HARD IS IT FOR YOU TO PAY FOR THE VERY BASICS LIKE FOOD, HOUSING, MEDICAL CARE, AND HEATING?: NOT HARD AT ALL

## 2023-09-20 SDOH — ECONOMIC STABILITY: HOUSING INSECURITY
IN THE LAST 12 MONTHS, WAS THERE A TIME WHEN YOU DID NOT HAVE A STEADY PLACE TO SLEEP OR SLEPT IN A SHELTER (INCLUDING NOW)?: NO

## 2023-09-20 ASSESSMENT — PATIENT HEALTH QUESTIONNAIRE - PHQ9
SUM OF ALL RESPONSES TO PHQ QUESTIONS 1-9: 1
2. FEELING DOWN, DEPRESSED OR HOPELESS: 1
1. LITTLE INTEREST OR PLEASURE IN DOING THINGS: 0
SUM OF ALL RESPONSES TO PHQ9 QUESTIONS 1 & 2: 1
SUM OF ALL RESPONSES TO PHQ QUESTIONS 1-9: 1

## 2023-09-26 ENCOUNTER — HOSPITAL ENCOUNTER (OUTPATIENT)
Dept: PHYSICAL THERAPY | Facility: CLINIC | Age: 73
Setting detail: THERAPIES SERIES
Discharge: HOME | End: 2023-09-26
Payer: MEDICARE

## 2023-09-26 PROCEDURE — 97110 THERAPEUTIC EXERCISES: CPT

## 2023-09-26 NOTE — FLOWSHEET NOTE
[x] [address] [place] [address]   Outpatient Rehabilitation &  Therapy  [facility]  P: [phone]  F: [phone]     Physical Therapy Daily Treatment Note    Date:  2023  Patient Name:  Yaya Aguirre    :  1950  MRN: [id number]  Physician: Irene Maldonado MD                          Insurance: MEDICARE PART A AND B/MMO ( BOMN/follows primary)  Medical Diagnosis:   M70.61 (ICD-10-CM) - Trochanteric bursitis, right hip   M70.62 (ICD-10-CM) - Trochanteric bursitis, left hip   M76.31 (ICD-10-CM) - Iliotibial band syndrome, right leg   M76.32 (ICD-10-CM) - Iliotibial band syndrome, left leg                           Rehab Codes: M25.551, M25.552, R26.89  Onset date: 23                 Next 's appt.: 23       Visit# / total visits:      Cancels/No Shows:     Subjective:    Pain:  [x] Yes  [] No Location: B hip  Pain Rating: (0-10 scale) 0/10  Pain altered Tx:  [x] No  [] Yes  Action:  Comments: Pt mentioned that her R side is feeling good today but she is having some pain/discomfort with L side hip going down into her knee. Objective:  Modalities: prn  Manual:  prn  Precautions: standard  Exercises:  Exercise Reps/ Time Weight/ Level Comments    SciFit 10m   x   Calf/HS stretch 3x30\"   x   Step Hip flexor stretch 3x30\"             Mat          Bridges 2x15 Lime      TA sets 2x10, 5\"   x   TA marches x20   x   3-way Clamshell 2x15 Lime    x   SL hip abduction 2x15   x          // Bars       3-way hip 15x2 Lime    x   Step ups 2x10 8\"     Mini lunge  10x BOSU  x   Palloff Press 20x Lime   x   TG:squats 2x10 L 13  x          Other:     Specific Instructions for next treatment:     Treatment Charges: Mins Units   []  Modalities: HP     [x]  Ther Exercise 40 3   []  Manual Therapy     []  Ther Activities     []  Aquatics     []  Vasocompression     []  Other     Total Treatment time 40 3       Assessment: [x] Progressing toward goals. [] No change.      [x] Other: Pt

## 2023-10-03 ENCOUNTER — HOSPITAL ENCOUNTER (OUTPATIENT)
Dept: PHYSICAL THERAPY | Facility: CLINIC | Age: 73
Setting detail: THERAPIES SERIES
Discharge: HOME | End: 2023-10-03
Payer: MEDICARE

## 2023-10-03 PROCEDURE — 97110 THERAPEUTIC EXERCISES: CPT

## 2023-10-03 NOTE — FLOWSHEET NOTE
[x] [address] [place] [address]   Outpatient Rehabilitation &  Therapy  [facility]  P: [phone]  F: [phone]     Physical Therapy Daily Treatment Note    Date:  10/3/2023  Patient Name:  Barbara Pedroza    :  1950  MRN: [id number]  Physician: Jr Mackey MD                          Insurance: MEDICARE PART A AND B/MMO ( BOMN/follows primary)  Medical Diagnosis:   M70.61 (ICD-10-CM) - Trochanteric bursitis, right hip   M70.62 (ICD-10-CM) - Trochanteric bursitis, left hip   M76.31 (ICD-10-CM) - Iliotibial band syndrome, right leg   M76.32 (ICD-10-CM) - Iliotibial band syndrome, left leg                           Rehab Codes: M25.551, M25.552, R26.89  Onset date: 23                 Next 's appt.: 23       Visit# / total visits:      Cancels/No Shows:     Subjective:    Pain:  [x] Yes  [] No Location: B hip  Pain Rating: (0-10 scale) 0/10  Pain altered Tx:  [x] No  [] Yes  Action:  Comments: Pt stated that she was doing good today. Objective:  Modalities: prn  Manual:  prn  Precautions: standard  Exercises:  Exercise Reps/ Time Weight/ Level Comments    SciFit 10m   xx   Calf/HS stretch 3x30\"   xx   Step Hip flexor stretch 3x30\"             Mat          Bridges 2x15 [place]    xx   TA sets with SB crunch 2x10, 5\"   xx   TA marches x20   xx   3-way Clamshell 2x15 [place]     xx   SL hip abduction 10x [place]   xx          // Bars       3-way hip 15x2 [place]     xx   Step ups 2x10 8\" Fwd and lat xx   Mini lunge  10x BOSU     Banded walk outs 20x Red spt cord      TG:squats 15x2 L 15  xx          Other:     Specific Instructions for next treatment:     Treatment Charges: Mins Units   []  Modalities: HP     [x]  Ther Exercise 40 3   []  Manual Therapy     []  Ther Activities     []  Aquatics     []  Vasocompression     []  Other     Total Treatment time 40 3       Assessment: [x] Progressing toward goals. [] No change.      [x] Other: Pt able to be progressed

## 2023-10-10 ENCOUNTER — HOSPITAL ENCOUNTER (OUTPATIENT)
Dept: PULMONOLOGY | Age: 73
Discharge: HOME | End: 2023-10-10
Payer: MEDICARE

## 2023-10-10 DIAGNOSIS — R06.09 DYSPNEA ON EXERTION: ICD-10-CM

## 2023-10-10 LAB
DLCO %PRED: NORMAL
DLCO PRED: NORMAL
DLCO/VA %PRED: NORMAL
DLCO/VA PRED: NORMAL
DLCO/VA: NORMAL
DLCO: NORMAL
EXPIRATORY TIME-POST: NORMAL
EXPIRATORY TIME: NORMAL
FEF 25-75% %CHNG: NORMAL
FEF 25-75% %PRED-POST: NORMAL
FEF 25-75% %PRED-PRE: NORMAL
FEF 25-75% PRED: NORMAL
FEF 25-75%-POST: NORMAL
FEF 25-75%-PRE: NORMAL
FEV1 %PRED-POST: NORMAL
FEV1 %PRED-PRE: NORMAL
FEV1 PRED: NORMAL
FEV1-POST: NORMAL
FEV1-PRE: NORMAL
FEV1/FVC %PRED-POST: NORMAL
FEV1/FVC %PRED-PRE: NORMAL
FEV1/FVC PRED: NORMAL
FEV1/FVC-POST: NORMAL
FEV1/FVC-PRE: NORMAL
FVC %PRED-POST: NORMAL
FVC %PRED-PRE: NORMAL
FVC PRED: NORMAL
FVC-POST: NORMAL
FVC-PRE: NORMAL
GAW %PRED: NORMAL
GAW PRED: NORMAL
GAW: NORMAL
IC %PRED: NORMAL
IC PRED: NORMAL
IC: NORMAL
MEP: NORMAL
MIP: NORMAL
MVV %PRED-PRE: NORMAL
MVV PRED: NORMAL
MVV-PRE: NORMAL
PEF %PRED-POST: NORMAL
PEF %PRED-PRE: NORMAL
PEF PRED: NORMAL
PEF%CHNG: NORMAL
PEF-POST: NORMAL
PEF-PRE: NORMAL
RAW %PRED: NORMAL
RAW PRED: NORMAL
RAW: NORMAL
RV %PRED: NORMAL
RV PRED: NORMAL
RV: NORMAL
SVC %PRED: NORMAL
SVC PRED: NORMAL
SVC: NORMAL
TLC %PRED: NORMAL
TLC PRED: NORMAL
TLC: NORMAL
VA %PRED: NORMAL
VA PRED: NORMAL
VA: NORMAL
VTG %PRED: NORMAL
VTG PRED: NORMAL
VTG: NORMAL

## 2023-10-10 PROCEDURE — 6370000000 HC RX 637 (ALT 250 FOR IP): Performed by: FAMILY MEDICINE

## 2023-10-10 PROCEDURE — 94060 EVALUATION OF WHEEZING: CPT

## 2023-10-10 PROCEDURE — 94729 DIFFUSING CAPACITY: CPT

## 2023-10-10 PROCEDURE — 94726 PLETHYSMOGRAPHY LUNG VOLUMES: CPT

## 2023-10-10 RX ORDER — ALBUTEROL SULFATE 90 UG/1
2 AEROSOL, METERED RESPIRATORY (INHALATION) ONCE
Status: COMPLETED | OUTPATIENT
Start: 2023-10-10 | End: 2023-10-10

## 2023-10-10 RX ADMIN — ALBUTEROL SULFATE 2 PUFF: 90 AEROSOL, METERED RESPIRATORY (INHALATION) at 15:37

## 2023-10-12 ENCOUNTER — HOSPITAL ENCOUNTER (OUTPATIENT)
Dept: PHYSICAL THERAPY | Facility: CLINIC | Age: 73
Setting detail: THERAPIES SERIES
Discharge: HOME | End: 2023-10-12
Payer: MEDICARE

## 2023-10-12 PROCEDURE — 97110 THERAPEUTIC EXERCISES: CPT

## 2023-10-12 NOTE — PRE-CERTIFICATION NOTE
Medicare Cap   [x] Physical Therapy  [] Speech Therapy  [] Occupational therapy  *PT and Speech caps combine      $2230 Limit for PT and Speech combined  $2230 Limit for OT individually  At the beginning of the month where you expect to go over $2230, please add the 1111 [facility] modifier      Patient Name: Liza Sour: 1950    Note:  This is an estimate of charges billed.      Date of 705 [facility] Name # units/ charge $$$ charge Daily Total Charge Ongoing Total $$$   8/1/23 María, man, ex 3 95.95+20.32+21.75 138.02 138.02   8/3 2 ex, man  24.77+19.15+17.89 61.81 199.83   8/8 2 ex  24.77+19.15 43.92 243.75   8/11 3 ex  24.77+19.15+19.15 63.07 306.82   8/15 3ex  24.77+19.15+19.15 63.07 369.89   8/18 3ex  24.77+19.15+19.15 63.07 432.98   9/1 3ex  24.77+19.15+19.15 63.07 496.03   9/6   24.77+19.15+19.15 63.07 559.10   9/13   24.77+19.15+19.15 63.07 622.17   9/19   24.77+19.15+19.15 63.07 685.24   9/26   24.77+19.15+19.15 63.07 748.31   10/3   24.77+19.15+19.15 63.07 811.38   10/12 3ex  28.13+21.75*2 71.63 883.01

## 2023-10-12 NOTE — PRE-CERTIFICATION NOTE
Medicare Cap   [] Physical Therapy  [] Speech Therapy  [] Occupational therapy  *PT and Speech caps combine      $2230 Limit for PT and Speech combined  $2230 Limit for OT individually  At the beginning of the month where you expect to go over $2230, please add the 1111 [facility] modifier      Patient Name: Danielle Duty: 1950    Note:  This is an estimate of charges billed.      Date of 705 [facility] Name # units/ charge $$$ charge Daily Total Charge Ongoing Total $$$   8/1/23 María, man, ex 3 95.95+20.32+21.75 138.02 138.02   8/3 2 ex, man  24.77+19.15+17.89 61.81 199.83   8/8 2 ex  24.77+19.15 43.92 243.75   8/11 3 ex  24.77+19.15+19.15 63.07 306.82   8/15 3ex  24.77+19.15+19.15 63.07 369.89   8/18 3ex  24.77+19.15+19.15 63.07 432.98   9/1 3ex  24.77+19.15+19.15 63.07 496.03   9/6   24.77+19.15+19.15 63.07 559.10   9/13   24.77+19.15+19.15 63.07 622.17   9/19   24.77+19.15+19.15 63.07 685.24   9/26   24.77+19.15+19.15 63.07 748.31   10/3   24.77+19.15+19.15 63.07 811.38

## 2023-10-17 ENCOUNTER — HOSPITAL ENCOUNTER (OUTPATIENT)
Dept: PHYSICAL THERAPY | Facility: CLINIC | Age: 73
Setting detail: THERAPIES SERIES
Discharge: HOME | End: 2023-10-17
Payer: MEDICARE

## 2023-10-17 PROCEDURE — 97110 THERAPEUTIC EXERCISES: CPT

## 2023-10-17 NOTE — PRE-CERTIFICATION NOTE
Medicare Cap   [x] Physical Therapy  [] Speech Therapy  [] Occupational therapy  *PT and Speech caps combine      $2230 Limit for PT and Speech combined  $2230 Limit for OT individually  At the beginning of the month where you expect to go over $2230, please add the 1111 [facility] modifier      Patient Name: Kerrie Austin: 1950    Note:  This is an estimate of charges billed.      Date of 705 [facility] Name # units/ charge $$$ charge Daily Total Charge Ongoing Total $$$   8/1/23 María, man, ex 3 95.95+20.32+21.75 138.02 138.02   8/3 2 ex, man  24.77+19.15+17.89 61.81 199.83   8/8 2 ex  24.77+19.15 43.92 243.75   8/11 3 ex  24.77+19.15+19.15 63.07 306.82   8/15 3ex  24.77+19.15+19.15 63.07 369.89   8/18 3ex  24.77+19.15+19.15 63.07 432.98   9/1 3ex  24.77+19.15+19.15 63.07 496.03   9/6   24.77+19.15+19.15 63.07 559.10   9/13   24.77+19.15+19.15 63.07 622.17   9/19   24.77+19.15+19.15 63.07 685.24   9/26   24.77+19.15+19.15 63.07 748.31   10/3   24.77+19.15+19.15 63.07 811.38   10/12 3ex  28.13+21.75*2 71.63 883.01   10/17 3 ex PTA   63.07 946.08

## 2023-10-24 ENCOUNTER — HOSPITAL ENCOUNTER (OUTPATIENT)
Dept: PHYSICAL THERAPY | Facility: CLINIC | Age: 73
Setting detail: THERAPIES SERIES
Discharge: HOME | End: 2023-10-24
Payer: MEDICARE

## 2023-10-30 ENCOUNTER — TELEPHONE (OUTPATIENT)
Age: 73
End: 2023-10-30

## 2023-10-31 ENCOUNTER — HOSPITAL ENCOUNTER (OUTPATIENT)
Dept: NUCLEAR MEDICINE | Age: 73
Discharge: HOME | End: 2023-11-02
Attending: FAMILY MEDICINE
Payer: MEDICARE

## 2023-10-31 ENCOUNTER — HOSPITAL ENCOUNTER (OUTPATIENT)
Dept: CT IMAGING | Age: 73
Discharge: HOME | End: 2023-11-02
Attending: FAMILY MEDICINE
Payer: MEDICARE

## 2023-10-31 ENCOUNTER — HOSPITAL ENCOUNTER (OUTPATIENT)
Age: 73
Discharge: HOME | End: 2023-11-02
Attending: FAMILY MEDICINE
Payer: MEDICARE

## 2023-10-31 VITALS — HEART RATE: 90 BPM | DIASTOLIC BLOOD PRESSURE: 93 MMHG | SYSTOLIC BLOOD PRESSURE: 151 MMHG

## 2023-10-31 DIAGNOSIS — R06.09 DYSPNEA ON EXERTION: ICD-10-CM

## 2023-10-31 PROCEDURE — 93017 CV STRESS TEST TRACING ONLY: CPT

## 2023-10-31 PROCEDURE — 71250 CT THORAX DX C-: CPT

## 2023-10-31 PROCEDURE — 3430000000 HC RX DIAGNOSTIC RADIOPHARMACEUTICAL: Performed by: FAMILY MEDICINE

## 2023-10-31 PROCEDURE — 6360000002 HC RX W HCPCS: Performed by: FAMILY MEDICINE

## 2023-10-31 PROCEDURE — A9500 TC99M SESTAMIBI: HCPCS | Performed by: FAMILY MEDICINE

## 2023-10-31 PROCEDURE — 2580000003 HC RX 258: Performed by: FAMILY MEDICINE

## 2023-10-31 PROCEDURE — 78452 HT MUSCLE IMAGE SPECT MULT: CPT

## 2023-10-31 RX ORDER — REGADENOSON 0.08 MG/ML
0.4 INJECTION, SOLUTION INTRAVENOUS
Status: COMPLETED | OUTPATIENT
Start: 2023-10-31 | End: 2023-10-31

## 2023-10-31 RX ORDER — ATROPINE SULFATE 0.1 MG/ML
0.5 INJECTION INTRAVENOUS EVERY 5 MIN PRN
Status: DISCONTINUED | OUTPATIENT
Start: 2023-10-31 | End: 2023-10-31

## 2023-10-31 RX ORDER — ALBUTEROL SULFATE 90 UG/1
2 AEROSOL, METERED RESPIRATORY (INHALATION) PRN
Status: DISCONTINUED | OUTPATIENT
Start: 2023-10-31 | End: 2023-10-31

## 2023-10-31 RX ORDER — SODIUM CHLORIDE 9 MG/ML
500 INJECTION, SOLUTION INTRAVENOUS CONTINUOUS PRN
Status: DISCONTINUED | OUTPATIENT
Start: 2023-10-31 | End: 2023-10-31

## 2023-10-31 RX ORDER — TETRAKIS(2-METHOXYISOBUTYLISOCYANIDE)COPPER(I) TETRAFLUOROBORATE 1 MG/ML
15 INJECTION, POWDER, LYOPHILIZED, FOR SOLUTION INTRAVENOUS
Status: COMPLETED | OUTPATIENT
Start: 2023-10-31 | End: 2023-10-31

## 2023-10-31 RX ORDER — TETRAKIS(2-METHOXYISOBUTYLISOCYANIDE)COPPER(I) TETRAFLUOROBORATE 1 MG/ML
35 INJECTION, POWDER, LYOPHILIZED, FOR SOLUTION INTRAVENOUS
Status: COMPLETED | OUTPATIENT
Start: 2023-10-31 | End: 2023-10-31

## 2023-10-31 RX ORDER — SODIUM CHLORIDE 0.9 % (FLUSH) 0.9 %
5-40 SYRINGE (ML) INJECTION PRN
Status: DISCONTINUED | OUTPATIENT
Start: 2023-10-31 | End: 2023-10-31

## 2023-10-31 RX ORDER — NITROGLYCERIN 0.4 MG/1
0.4 TABLET SUBLINGUAL EVERY 5 MIN PRN
Status: DISCONTINUED | OUTPATIENT
Start: 2023-10-31 | End: 2023-10-31

## 2023-10-31 RX ORDER — METOPROLOL TARTRATE 5 MG/5ML
5 INJECTION INTRAVENOUS EVERY 5 MIN PRN
Status: DISCONTINUED | OUTPATIENT
Start: 2023-10-31 | End: 2023-10-31

## 2023-10-31 RX ORDER — SODIUM CHLORIDE 0.9 % (FLUSH) 0.9 %
10 SYRINGE (ML) INJECTION ONCE
Status: COMPLETED | OUTPATIENT
Start: 2023-10-31 | End: 2023-10-31

## 2023-10-31 RX ORDER — AMINOPHYLLINE 25 MG/ML
50 INJECTION, SOLUTION INTRAVENOUS PRN
Status: DISCONTINUED | OUTPATIENT
Start: 2023-10-31 | End: 2023-10-31

## 2023-10-31 RX ADMIN — SODIUM CHLORIDE, PRESERVATIVE FREE 10 ML: 5 INJECTION INTRAVENOUS at 10:08

## 2023-10-31 RX ADMIN — SODIUM CHLORIDE, PRESERVATIVE FREE 10 ML: 5 INJECTION INTRAVENOUS at 08:14

## 2023-10-31 RX ADMIN — REGADENOSON 0.4 MG: 0.08 INJECTION, SOLUTION INTRAVENOUS at 10:14

## 2023-10-31 RX ADMIN — TETRAKIS(2-METHOXYISOBUTYLISOCYANIDE)COPPER(I) TETRAFLUOROBORATE 13.3 MILLICURIE: 1 INJECTION, POWDER, LYOPHILIZED, FOR SOLUTION INTRAVENOUS at 08:10

## 2023-10-31 RX ADMIN — TETRAKIS(2-METHOXYISOBUTYLISOCYANIDE)COPPER(I) TETRAFLUOROBORATE 43.6 MILLICURIE: 1 INJECTION, POWDER, LYOPHILIZED, FOR SOLUTION INTRAVENOUS at 10:20

## 2023-10-31 RX ADMIN — SODIUM CHLORIDE, PRESERVATIVE FREE 10 ML: 5 INJECTION INTRAVENOUS at 10:14

## 2023-10-31 NOTE — PROGRESS NOTES
Lexiscan stress test completed and reviewed by JOSE Laws. Patient experienced no chest pain. PO caffeine provided 2 minutes after injection while in recovery and symptoms resolved. IV removed. VS returned to baseline, see flow sheets for documented VS throughout procedure.

## 2023-10-31 NOTE — PROGRESS NOTES
Patient present for Lexiscan stress test. Educated on procedure. All questions/concerns addressed. Allergies and medication reviewed. Patient prepped for procedure. Stress test will be supervised by JOSE Bernstein.

## 2023-11-01 LAB
STRESS BASELINE DIAS BP: 94 MMHG
STRESS BASELINE HR: 70 BPM
STRESS BASELINE ST DEPRESSION: 0 MM
STRESS BASELINE SYS BP: 156 MMHG
STRESS ESTIMATED WORKLOAD: 1 METS
STRESS PEAK DIAS BP: 77 MMHG
STRESS PEAK SYS BP: 166 MMHG
STRESS PERCENT HR ACHIEVED: 63 %
STRESS POST PEAK HR: 94 BPM
STRESS RATE PRESSURE PRODUCT: NORMAL BPM*MMHG
STRESS ST DEPRESSION: 0 MM
STRESS TARGET HR: 149 BPM
TID: 1.05

## 2023-11-13 ENCOUNTER — HOSPITAL ENCOUNTER (OUTPATIENT)
Dept: PHYSICAL THERAPY | Facility: CLINIC | Age: 73
Setting detail: THERAPIES SERIES
Discharge: HOME | End: 2023-11-13
Payer: MEDICARE

## 2023-11-13 PROCEDURE — 97110 THERAPEUTIC EXERCISES: CPT

## 2023-11-13 NOTE — PRE-CERTIFICATION NOTE
Medicare Cap   [x] Physical Therapy  [] Speech Therapy  [] Occupational therapy  *PT and Speech caps combine      $2230 Limit for PT and Speech combined  $2230 Limit for OT individually  At the beginning of the month where you expect to go over $2230, please add the 1111 [facility] modifier      Patient Name: Donnie John: 1950    Note:  This is an estimate of charges billed.      Date of 705 [facility] Name # units/ charge $$$ charge Daily Total Charge Ongoing Total $$$   8/1/23 María, man, ex 3 95.95+20.32+21.75 138.02 138.02   8/3 2 ex, man  24.77+19.15+17.89 61.81 199.83   8/8 2 ex  24.77+19.15 43.92 243.75   8/11 3 ex  24.77+19.15+19.15 63.07 306.82   8/15 3ex  24.77+19.15+19.15 63.07 369.89   8/18 3ex  24.77+19.15+19.15 63.07 432.98   9/1 3ex  24.77+19.15+19.15 63.07 496.03   9/6   24.77+19.15+19.15 63.07 559.10   9/13   24.77+19.15+19.15 63.07 622.17   9/19   24.77+19.15+19.15 63.07 685.24   9/26   24.77+19.15+19.15 63.07 748.31   10/3   24.77+19.15+19.15 63.07 811.38   10/12 3ex  28.13+21.75*2 71.63 883.01   10/17 3 ex PTA   63.07 946.08   11/13 3 ex PTA   63.07 1009.15

## 2023-11-13 NOTE — FLOWSHEET NOTE
[x] [address] [place] [address]   Outpatient Rehabilitation &  Therapy  [facility]  P: [phone]  F: [phone]     Physical Therapy Daily Treatment Note    Date:  2023  Patient Name:  Kevin Alvarez    :  1950  MRN: [id number]  Physician: Fabiano Schilling MD                          Insurance: MEDICARE PART A AND B/MMO ( BOMN/follows primary)  Medical Diagnosis:   M70.61 (ICD-10-CM) - Trochanteric bursitis, right hip   M70.62 (ICD-10-CM) - Trochanteric bursitis, left hip   M76.31 (ICD-10-CM) - Iliotibial band syndrome, right leg   M76.32 (ICD-10-CM) - Iliotibial band syndrome, left leg                           Rehab Codes: M25.551, M25.552, R26.89  Onset date: 23                 Next 's appt.: 23       Visit# / total visits: 15/20     Cancels/No Shows:     Subjective:    Pain:  [x] Yes  [] No Location: B hip  Pain Rating: (0-10 scale) 0/10  Pain altered Tx:  [x] No  [] Yes  Action:  Comments: Pt stated that she has not been keeping up with her exercises and it may show today.     Objective:  Modalities: prn  Manual:  prn  Precautions: standard  Exercises:  Exercise Reps/ Time Weight/ Level Comments    SciFit 10m L3  x   Calf/HS stretch 3x30\"   x          Mat          Bridges 2x15 [place]     x   SB bridge 2x10 red Straight knees x   HS isometric feet on step 10x5\" 8\"  x   TA sets with SB crunch 10\"x15   x   TA marches x20   x   3-way Clamshell 2x15 [place]      x   SL hip abduction 10x [place]    x          // Bars       3-way hip 2x15 [place]     x   Step ups 2x10 8\" Fwd and lat    Mini lunge  10x BOSU     Banded walk outs 20x Red spt cord      TG:squats/HR 2x15 L 16  x   Resisted side stepping 3 laps lime ankles x   Other:     Specific Instructions for next treatment:       Treatment Charges: Mins Units   []  Modalities: HP     [x]  Ther Exercise 45 3   []  Manual Therapy     []  Ther Activities     []  Aquatics     []  Vasocompression     []  Other     Total Treatment time 45

## 2023-11-21 ENCOUNTER — HOSPITAL ENCOUNTER (OUTPATIENT)
Dept: PHYSICAL THERAPY | Facility: CLINIC | Age: 73
Setting detail: THERAPIES SERIES
Discharge: HOME | End: 2023-11-21
Payer: MEDICARE

## 2023-11-21 PROCEDURE — 97110 THERAPEUTIC EXERCISES: CPT

## 2023-11-29 ENCOUNTER — HOSPITAL ENCOUNTER (OUTPATIENT)
Dept: PHYSICAL THERAPY | Facility: CLINIC | Age: 73
Setting detail: THERAPIES SERIES
Discharge: HOME | End: 2023-11-29
Payer: MEDICARE

## 2023-11-29 PROCEDURE — 97110 THERAPEUTIC EXERCISES: CPT

## 2023-11-29 NOTE — PRE-CERTIFICATION NOTE
Medicare Cap   [x] Physical Therapy  [] Speech Therapy  [] Occupational therapy  *PT and Speech caps combine      $2230 Limit for PT and Speech combined  $2230 Limit for OT individually  At the beginning of the month where you expect to go over $2230, please add the [facility] modifier      Patient Name: Lance Bravo: 1950    Note:  This is an estimate of charges billed.      Date of [facility] Name # units/ charge $$$ charge Daily Total Charge Ongoing Total $$$   8/1/23 María, man, ex 3 95.95+20.32+21.75 138.02 138.02   8/3 2 ex, man  24.77+19.15+17.89 61.81 199.83   8/8 2 ex  24.77+19.15 43.92 243.75   8/11 3 ex  24.77+19.15+19.15 63.07 306.82   8/15 3ex  24.77+19.15+19.15 63.07 369.89   8/18 3ex  24.77+19.15+19.15 63.07 432.98   9/1 3ex  24.77+19.15+19.15 63.07 496.03   9/6   24.77+19.15+19.15 63.07 559.10   9/13   24.77+19.15+19.15 63.07 622.17   9/19   24.77+19.15+19.15 63.07 685.24   9/26   24.77+19.15+19.15 63.07 748.31   10/3   24.77+19.15+19.15 63.07 811.38   10/12 3ex  28.13+21.75*2 71.63 883.01   10/17 3 ex PTA   63.07 946.08   11/13 3 ex PTA   63.07 1009.15   11/21 3ex   63.07 1072.22   11/29 3ex   63.07 1135.29

## 2023-11-29 NOTE — FLOWSHEET NOTE
[x] [address] [place] [address]   Outpatient Rehabilitation &  Therapy  [address] [place] [address] Rd  P: [phone]  F: [phone]     Physical Therapy Daily Treatment Note    Date:  2023  Patient Name:  Circlevillenarciso Duggan    :  1950  MRN: [id number]  Physician: Yariel Almeida MD                          Insurance: MEDICARE PART A AND B/MMO ( BOMN/follows primary)  Medical Diagnosis:   M70.61 (ICD-10-CM) - Trochanteric bursitis, right hip   M70.62 (ICD-10-CM) - Trochanteric bursitis, left hip   M76.31 (ICD-10-CM) - Iliotibial band syndrome, right leg   M76.32 (ICD-10-CM) - Iliotibial band syndrome, left leg                           Rehab Codes: M25.551, M25.552, R26.89  Onset date: 23                 Next 's appt.: 23       Visit# / total visits:      Cancels/No Shows:     Subjective:    Pain:  [x] Yes  [] No Location: B hip  Pain Rating: (0-10 scale) 0/10  Pain altered Tx:  [x] No  [] Yes  Action:  Comments: Pt mentioned she was feeling ok today.      Objective:  Modalities: prn  Manual:  prn  Precautions: standard  Exercises:  Exercise Reps/ Time Weight/ Level Comments    SciFit 10m L3  x   Calf/HS stretch 3x30\"   x          Mat          Bridges 2x15 [place]     x   SB bridge 2x10 red Straight knees x   HS isometric feet on step 10x5\" 8\"     TA sets with SB crunch 10\"x10 Red SB  x   TA dead bugs x20 Red SB  x   3-way Clamshell 2x15 [place]      x   SL hip abduction 30x [place]    x          // Bars       3-way hip 2x15 [place]      x   Step ups 2x10 8\" Fwd  x   Mini lunge  10x BOSU     Banded walk outs 20x Red spt cord      TG:squats/HR 2x15 L 18  x   Resisted side stepping 3 laps lime ankles    Other:     Specific Instructions for next treatment:       Treatment Charges: Mins Units   []  Modalities: HP     [x]  Ther Exercise 45 3   []  Manual Therapy     []  Ther Activities     []  Aquatics     []  Vasocompression     []  Other     Total Treatment time 45 3       Assessment: [x] Progressing toward

## 2023-12-05 ENCOUNTER — HOSPITAL ENCOUNTER (OUTPATIENT)
Dept: PHYSICAL THERAPY | Facility: CLINIC | Age: 73
Setting detail: THERAPIES SERIES
Discharge: HOME | End: 2023-12-05
Payer: MEDICARE

## 2023-12-05 PROCEDURE — 97110 THERAPEUTIC EXERCISES: CPT

## 2023-12-05 NOTE — FLOWSHEET NOTE
[x] [address] [place] [address]   Outpatient Rehabilitation &  Therapy  [address] [place] [address] Rd  P: [phone]  F: [phone]     Physical Therapy Daily Treatment Note    Date:  2023  Patient Name:  Ananda Schrader    :  1950  MRN: [id number]  Physician: Leslee Alarcon MD                          Insurance: MEDICARE PART A AND B/MMO ( BOMN/follows primary)  Medical Diagnosis:   M70.61 (ICD-10-CM) - Trochanteric bursitis, right hip   M70.62 (ICD-10-CM) - Trochanteric bursitis, left hip   M76.31 (ICD-10-CM) - Iliotibial band syndrome, right leg   M76.32 (ICD-10-CM) - Iliotibial band syndrome, left leg                           Rehab Codes: M25.551, M25.552, R26.89  Onset date: 23                 Next 's appt.: 23       Visit# / total visits:      Cancels/No Shows:     Subjective:    Pain:  [x] Yes  [] No Location: B hip  Pain Rating: (0-10 scale) 0/10  Pain altered Tx:  [x] No  [] Yes  Action:  Comments: Pt did not have any new complaints today.       Objective:  Modalities: prn  Manual:  prn  Precautions: standard  Exercises:  Exercise Reps/ Time Weight/ Level Comments    SciFit 10m L3     Calf/HS stretch 3x30\"             Mat          Bridges 2x15 [place]        SB bridge 2x10 red Straight knees    HS isometric feet on step 10x5\" 8\"     TA sets with SB crunch 10\"x10 Red SB     TA dead bugs x20 Red SB     3-way Clamshell 2x15 [place]         SL hip abduction 30x [place]              // Bars       3-way hip 2x15 [place]         Step ups 2x10 8\" Fwd     Mini lunge  10x BOSU     Banded walk outs 20x Red spt cord      TG:squats/HR 2x15 L 18     Resisted side stepping 3 laps Lime  ankles    Other:     Specific Instructions for next treatment:       Treatment Charges: Mins Units   []  Modalities: HP     [x]  Ther Exercise 45 3   []  Manual Therapy     []  Ther Activities     []  Aquatics     []  Vasocompression     []  Other     Total Treatment time 45 3       Assessment: [x] Progressing toward

## 2023-12-12 ENCOUNTER — HOSPITAL ENCOUNTER (OUTPATIENT)
Dept: PHYSICAL THERAPY | Facility: CLINIC | Age: 73
Setting detail: THERAPIES SERIES
Discharge: HOME | End: 2023-12-12
Payer: MEDICARE

## 2023-12-12 PROCEDURE — 97110 THERAPEUTIC EXERCISES: CPT

## 2023-12-12 NOTE — FLOWSHEET NOTE
Trunk Rotation  - 1 x daily - 7 x weekly - 10 reps - 10\" hold  Comprehension of Education:  [x] Verbalizes understanding. [x] Demonstrates understanding. [] Needs review. [] Demonstrates/verbalizes HEP/Ed previously given. Plan: [x] Continue with current plan of care towards goals.       Time In: 12:00 pm          Time Out: 12:57 pm    Electronically signed by:  Micky Burkitt, PTA

## 2023-12-13 NOTE — PROGRESS NOTES
cough, chest tightness, shortness of breath and wheezing. Cardiovascular:  Negative for chest pain, palpitations and leg swelling. Gastrointestinal:  Negative for abdominal distention, abdominal pain, blood in stool, constipation, diarrhea, nausea and vomiting. Genitourinary:  Negative for dysuria, hematuria and urgency. Musculoskeletal:  Negative for back pain, neck pain and neck stiffness. Skin:  Negative for rash and wound. Neurological:  Negative for syncope, weakness, light-headedness and headaches. Hematological:  Negative for adenopathy. Does not bruise/bleed easily. Psychiatric/Behavioral:  Negative for suicidal ideas. The patient is not nervous/anxious. REVIEWED INFORMATION      Allergies   Allergen Reactions    Prednisone Hallucinations      & Tachycardia       Current Outpatient Medications   Medication Sig Dispense Refill    vitamin D (CHOLECALCIFEROL) 50 MCG (2000 UT) CAPS capsule Take 1 capsule by mouth daily      lisinopril (PRINIVIL;ZESTRIL) 20 MG tablet        No current facility-administered medications for this visit.         Patient Active Problem List   Diagnosis    Anxiety    Bilateral tinnitus    Chronic deep vein thrombosis (DVT) of calf muscle vein of left lower extremity (HCC)    PENA (dyspnea on exertion)    History of total left knee replacement    HTN (hypertension), benign    Mixed hyperlipidemia    Morbid obesity (HCC)    Occipital neuralgia    Primary osteoarthritis of left knee    Vitamin D deficiency    Varicose veins of both lower extremities    Tubular adenoma    Superficial thrombophlebitis    Post-op pain       Past Medical History:   Diagnosis Date    Hypertension        Past Surgical History:   Procedure Laterality Date    TOTAL KNEE ARTHROPLASTY Bilateral         Social History     Socioeconomic History    Marital status:      Spouse name: None    Number of children: None    Years of education: None    Highest education level: None   Tobacco Use

## 2023-12-19 ENCOUNTER — HOSPITAL ENCOUNTER (OUTPATIENT)
Dept: PHYSICAL THERAPY | Facility: CLINIC | Age: 73
Setting detail: THERAPIES SERIES
End: 2023-12-19
Payer: MEDICARE

## 2023-12-21 ENCOUNTER — APPOINTMENT (OUTPATIENT)
Dept: PHYSICAL THERAPY | Facility: CLINIC | Age: 73
End: 2023-12-21
Payer: MEDICARE

## 2023-12-28 ENCOUNTER — OFFICE VISIT (OUTPATIENT)
Age: 73
End: 2023-12-28
Payer: MEDICARE

## 2023-12-28 VITALS
BODY MASS INDEX: 42.51 KG/M2 | WEIGHT: 249 LBS | HEART RATE: 94 BPM | SYSTOLIC BLOOD PRESSURE: 128 MMHG | DIASTOLIC BLOOD PRESSURE: 70 MMHG | OXYGEN SATURATION: 98 % | HEIGHT: 64 IN | TEMPERATURE: 97.8 F | RESPIRATION RATE: 12 BRPM

## 2023-12-28 DIAGNOSIS — R06.02 SHORTNESS OF BREATH: Primary | ICD-10-CM

## 2023-12-28 DIAGNOSIS — I10 ESSENTIAL HYPERTENSION: ICD-10-CM

## 2023-12-28 DIAGNOSIS — F41.9 ANXIETY: ICD-10-CM

## 2023-12-28 DIAGNOSIS — E66.01 CLASS 3 SEVERE OBESITY WITH BODY MASS INDEX (BMI) OF 40.0 TO 44.9 IN ADULT, UNSPECIFIED OBESITY TYPE, UNSPECIFIED WHETHER SERIOUS COMORBIDITY PRESENT (HCC): ICD-10-CM

## 2023-12-28 DIAGNOSIS — M17.0 PRIMARY OSTEOARTHRITIS OF BOTH KNEES: ICD-10-CM

## 2023-12-28 DIAGNOSIS — E55.9 VITAMIN D DEFICIENCY: ICD-10-CM

## 2023-12-28 DIAGNOSIS — I83.813 VARICOSE VEINS OF BOTH LOWER EXTREMITIES WITH PAIN: ICD-10-CM

## 2023-12-28 PROCEDURE — G8427 DOCREV CUR MEDS BY ELIG CLIN: HCPCS | Performed by: FAMILY MEDICINE

## 2023-12-28 PROCEDURE — 3017F COLORECTAL CA SCREEN DOC REV: CPT | Performed by: FAMILY MEDICINE

## 2023-12-28 PROCEDURE — 3074F SYST BP LT 130 MM HG: CPT | Performed by: FAMILY MEDICINE

## 2023-12-28 PROCEDURE — 99214 OFFICE O/P EST MOD 30 MIN: CPT | Performed by: FAMILY MEDICINE

## 2023-12-28 PROCEDURE — G8484 FLU IMMUNIZE NO ADMIN: HCPCS | Performed by: FAMILY MEDICINE

## 2023-12-28 PROCEDURE — 1090F PRES/ABSN URINE INCON ASSESS: CPT | Performed by: FAMILY MEDICINE

## 2023-12-28 PROCEDURE — 3078F DIAST BP <80 MM HG: CPT | Performed by: FAMILY MEDICINE

## 2023-12-28 PROCEDURE — G8417 CALC BMI ABV UP PARAM F/U: HCPCS | Performed by: FAMILY MEDICINE

## 2023-12-28 PROCEDURE — G8400 PT W/DXA NO RESULTS DOC: HCPCS | Performed by: FAMILY MEDICINE

## 2023-12-28 PROCEDURE — 1123F ACP DISCUSS/DSCN MKR DOCD: CPT | Performed by: FAMILY MEDICINE

## 2023-12-28 PROCEDURE — 1036F TOBACCO NON-USER: CPT | Performed by: FAMILY MEDICINE

## 2024-01-15 ENCOUNTER — TELEPHONE (OUTPATIENT)
Age: 74
End: 2024-01-15

## 2024-01-15 DIAGNOSIS — I83.93 VARICOSE VEINS OF BOTH LOWER EXTREMITIES, UNSPECIFIED WHETHER COMPLICATED: Primary | ICD-10-CM

## 2024-01-15 DIAGNOSIS — I83.893 VARICOSE VEINS OF BILATERAL LOWER EXTREMITIES WITH OTHER COMPLICATIONS: ICD-10-CM

## 2024-01-15 NOTE — TELEPHONE ENCOUNTER
Dr Delos Reyes called and said a Venous Duplex study should be done first before they will schedule an appt with the patient. They received the referral but will not see the patient until this test is done.

## 2024-01-17 ENCOUNTER — TELEPHONE (OUTPATIENT)
Age: 74
End: 2024-01-17

## 2024-01-17 NOTE — TELEPHONE ENCOUNTER
Patient called and states that [facility] hasn't gotten her order for the venous dulplex, I see the order under other orders and explained to patient that we are a part of [facility] and they should be able to look into her chart under other orders and see the order.  I am sending the task to clinical team to just make sure order has been put in correctly.

## 2024-02-07 RX ORDER — LISINOPRIL 20 MG/1
20 TABLET ORAL DAILY
Qty: 90 TABLET | Refills: 2 | Status: SHIPPED | OUTPATIENT
Start: 2024-02-07

## 2024-02-07 NOTE — TELEPHONE ENCOUNTER
Trinh Garza is calling to request a refill on the following medication(s):    Medication Request:  Requested Prescriptions     Pending Prescriptions Disp Refills    lisinopril (PRINIVIL;ZESTRIL) 20 MG tablet 90 tablet 2     Sig: Take 1 tablet by mouth daily       Last Visit Date (If Applicable):  12/28/2023    Next Visit Date:    7/8/2024

## 2024-02-15 ENCOUNTER — TELEPHONE (OUTPATIENT)
Dept: VASCULAR SURGERY | Age: 74
End: 2024-02-15

## 2024-02-15 NOTE — TELEPHONE ENCOUNTER
Called and spoke to Dr. Matos office and informed them that patient will need a venous reflux study before we can schedule an appointment.  Referral sent back to Dr. Matos office with notes to schedule testing and resend referral if patient request to follow up.

## 2024-04-25 NOTE — FLOWSHEET NOTE
3 sets - 10 reps  - Supine Lower Trunk Rotation  - 1 x daily - 7 x weekly - 10 reps - 10\" hold  Comprehension of Education:  [x] Verbalizes understanding. [x] Demonstrates understanding. [] Needs review. [] Demonstrates/verbalizes HEP/Ed previously given. Plan: [x] Continue with current plan of care towards goals.       Time In: 12:00 pm          Time Out: 12:55 pm    Electronically signed by:  Tejal Ayala PTA
Detail Level: Detailed
Detail Level: Zone
Detail Level: Generalized

## 2024-08-09 ENCOUNTER — OFFICE VISIT (OUTPATIENT)
Dept: PRIMARY CARE CLINIC | Age: 74
End: 2024-08-09
Payer: MEDICARE

## 2024-08-09 VITALS
HEIGHT: 64 IN | TEMPERATURE: 97.9 F | WEIGHT: 248.8 LBS | BODY MASS INDEX: 42.47 KG/M2 | OXYGEN SATURATION: 95 % | DIASTOLIC BLOOD PRESSURE: 80 MMHG | HEART RATE: 83 BPM | SYSTOLIC BLOOD PRESSURE: 118 MMHG

## 2024-08-09 DIAGNOSIS — J01.90 ACUTE SINUSITIS, RECURRENCE NOT SPECIFIED, UNSPECIFIED LOCATION: Primary | ICD-10-CM

## 2024-08-09 DIAGNOSIS — R05.1 ACUTE COUGH: ICD-10-CM

## 2024-08-09 PROCEDURE — 1036F TOBACCO NON-USER: CPT | Performed by: PHYSICIAN ASSISTANT

## 2024-08-09 PROCEDURE — 99213 OFFICE O/P EST LOW 20 MIN: CPT | Performed by: PHYSICIAN ASSISTANT

## 2024-08-09 PROCEDURE — 3017F COLORECTAL CA SCREEN DOC REV: CPT | Performed by: PHYSICIAN ASSISTANT

## 2024-08-09 PROCEDURE — 1123F ACP DISCUSS/DSCN MKR DOCD: CPT | Performed by: PHYSICIAN ASSISTANT

## 2024-08-09 PROCEDURE — 3074F SYST BP LT 130 MM HG: CPT | Performed by: PHYSICIAN ASSISTANT

## 2024-08-09 PROCEDURE — G8400 PT W/DXA NO RESULTS DOC: HCPCS | Performed by: PHYSICIAN ASSISTANT

## 2024-08-09 PROCEDURE — 3079F DIAST BP 80-89 MM HG: CPT | Performed by: PHYSICIAN ASSISTANT

## 2024-08-09 PROCEDURE — G8417 CALC BMI ABV UP PARAM F/U: HCPCS | Performed by: PHYSICIAN ASSISTANT

## 2024-08-09 PROCEDURE — G8427 DOCREV CUR MEDS BY ELIG CLIN: HCPCS | Performed by: PHYSICIAN ASSISTANT

## 2024-08-09 PROCEDURE — 1090F PRES/ABSN URINE INCON ASSESS: CPT | Performed by: PHYSICIAN ASSISTANT

## 2024-08-09 RX ORDER — MULTIVIT-MIN/FA/LYCOPEN/LUTEIN .4-300-25
TABLET ORAL
COMMUNITY

## 2024-08-09 RX ORDER — DOXYCYCLINE HYCLATE 100 MG
100 TABLET ORAL 2 TIMES DAILY
Qty: 20 TABLET | Refills: 0 | Status: SHIPPED | OUTPATIENT
Start: 2024-08-09 | End: 2024-08-19

## 2024-08-09 RX ORDER — LORATADINE 10 MG/1
10 TABLET ORAL DAILY
Qty: 30 TABLET | Refills: 0 | Status: SHIPPED | OUTPATIENT
Start: 2024-08-09

## 2024-08-09 RX ORDER — FLUTICASONE PROPIONATE 50 MCG
1 SPRAY, SUSPENSION (ML) NASAL DAILY
Qty: 16 G | Refills: 0 | Status: SHIPPED | OUTPATIENT
Start: 2024-08-09

## 2024-08-09 RX ORDER — PREDNISONE 10 MG/1
10 TABLET ORAL 2 TIMES DAILY
Qty: 10 TABLET | Refills: 0 | Status: SHIPPED | OUTPATIENT
Start: 2024-08-09 | End: 2024-08-14

## 2024-08-09 ASSESSMENT — ENCOUNTER SYMPTOMS
CHEST TIGHTNESS: 0
RHINORRHEA: 0
COUGH: 1
WHEEZING: 1
GASTROINTESTINAL NEGATIVE: 1
SINUS PRESSURE: 1
SHORTNESS OF BREATH: 0

## 2024-08-09 NOTE — PROGRESS NOTES
Palpations: Abdomen is soft.   Skin:     General: Skin is warm and dry.   Neurological:      Mental Status: She is alert and oriented to person, place, and time.           DIFFERENTIAL DIAGNOSIS:       Kenyatta reviewed the disposition diagnosis with the patient and or their family/guardian.  I have answered their questions and given discharge instructions.  They voiced understanding of these instructions and did not have anyfurther questions or complaints.      PROCEDURES:  No orders of the defined types were placed in this encounter.      No results found for this visit on 24.    FINALIMPRESSION      Visit Diagnoses and Associated Orders       Acute sinusitis, recurrence not specified, unspecified location    -  Primary         Acute cough             ORDERS WITHOUT AN ASSOCIATED DIAGNOSIS    Multiple Vitamins-Minerals (CENTRUM SILVER ADULT 50+) TABS [262014]      loratadine (CLARITIN) 10 MG tablet [57591]      fluticasone (FLONASE) 50 MCG/ACT nasal spray [49132]      predniSONE (DELTASONE) 10 MG tablet [6494]      doxycycline hyclate (VIBRA-TABS) 100 MG tablet [2625]                PLAN     Return if symptoms worsen or fail to improve.      DISCHARGEMEDICATIONS:  Orders Placed This Encounter   Medications    loratadine (CLARITIN) 10 MG tablet     Sig: Take 1 tablet by mouth daily     Dispense:  30 tablet     Refill:  0    fluticasone (FLONASE) 50 MCG/ACT nasal spray     Si spray by Each Nostril route daily     Dispense:  16 g     Refill:  0    predniSONE (DELTASONE) 10 MG tablet     Sig: Take 1 tablet by mouth 2 times daily for 5 days     Dispense:  10 tablet     Refill:  0    doxycycline hyclate (VIBRA-TABS) 100 MG tablet     Sig: Take 1 tablet by mouth 2 times daily for 10 days     Dispense:  20 tablet     Refill:  0         Plan:  Based on the duration and severity of the symptoms-- I will treat this as bacterial at this time.  Patient instructed to complete antibiotic prescription fully.  May use

## 2024-09-09 ENCOUNTER — OFFICE VISIT (OUTPATIENT)
Age: 74
End: 2024-09-09
Payer: MEDICARE

## 2024-09-09 VITALS
BODY MASS INDEX: 40.97 KG/M2 | WEIGHT: 240 LBS | OXYGEN SATURATION: 98 % | DIASTOLIC BLOOD PRESSURE: 76 MMHG | RESPIRATION RATE: 12 BRPM | HEART RATE: 82 BPM | HEIGHT: 64 IN | SYSTOLIC BLOOD PRESSURE: 120 MMHG

## 2024-09-09 DIAGNOSIS — E55.9 VITAMIN D DEFICIENCY: ICD-10-CM

## 2024-09-09 DIAGNOSIS — Z12.31 ENCOUNTER FOR SCREENING MAMMOGRAM FOR MALIGNANT NEOPLASM OF BREAST: ICD-10-CM

## 2024-09-09 DIAGNOSIS — M16.12 PRIMARY OSTEOARTHRITIS OF LEFT HIP: Primary | ICD-10-CM

## 2024-09-09 DIAGNOSIS — I10 ESSENTIAL HYPERTENSION: ICD-10-CM

## 2024-09-09 PROBLEM — M19.90 ARTHRITIS: Status: ACTIVE | Noted: 2024-09-09

## 2024-09-09 PROCEDURE — G8400 PT W/DXA NO RESULTS DOC: HCPCS | Performed by: FAMILY MEDICINE

## 2024-09-09 PROCEDURE — G8427 DOCREV CUR MEDS BY ELIG CLIN: HCPCS | Performed by: FAMILY MEDICINE

## 2024-09-09 PROCEDURE — 3074F SYST BP LT 130 MM HG: CPT | Performed by: FAMILY MEDICINE

## 2024-09-09 PROCEDURE — 99214 OFFICE O/P EST MOD 30 MIN: CPT | Performed by: FAMILY MEDICINE

## 2024-09-09 PROCEDURE — G8417 CALC BMI ABV UP PARAM F/U: HCPCS | Performed by: FAMILY MEDICINE

## 2024-09-09 PROCEDURE — 1123F ACP DISCUSS/DSCN MKR DOCD: CPT | Performed by: FAMILY MEDICINE

## 2024-09-09 PROCEDURE — 3078F DIAST BP <80 MM HG: CPT | Performed by: FAMILY MEDICINE

## 2024-09-09 PROCEDURE — 1036F TOBACCO NON-USER: CPT | Performed by: FAMILY MEDICINE

## 2024-09-09 PROCEDURE — 1090F PRES/ABSN URINE INCON ASSESS: CPT | Performed by: FAMILY MEDICINE

## 2024-09-09 PROCEDURE — 3017F COLORECTAL CA SCREEN DOC REV: CPT | Performed by: FAMILY MEDICINE

## 2024-09-09 ASSESSMENT — PATIENT HEALTH QUESTIONNAIRE - PHQ9
SUM OF ALL RESPONSES TO PHQ QUESTIONS 1-9: 0
SUM OF ALL RESPONSES TO PHQ9 QUESTIONS 1 & 2: 0
2. FEELING DOWN, DEPRESSED OR HOPELESS: NOT AT ALL
1. LITTLE INTEREST OR PLEASURE IN DOING THINGS: NOT AT ALL

## 2024-09-19 ENCOUNTER — HOSPITAL ENCOUNTER (OUTPATIENT)
Dept: MAMMOGRAPHY | Age: 74
Discharge: HOME | End: 2024-09-21
Payer: MEDICARE

## 2024-09-19 VITALS — HEIGHT: 64 IN | WEIGHT: 240 LBS | BODY MASS INDEX: 40.97 KG/M2

## 2024-09-19 DIAGNOSIS — Z12.31 ENCOUNTER FOR SCREENING MAMMOGRAM FOR MALIGNANT NEOPLASM OF BREAST: ICD-10-CM

## 2024-09-19 PROCEDURE — 77063 BREAST TOMOSYNTHESIS BI: CPT

## 2024-09-23 ENCOUNTER — TELEPHONE (OUTPATIENT)
Age: 74
End: 2024-09-23

## 2024-09-25 NOTE — TELEPHONE ENCOUNTER
I called the patient and yes she had a EKG done the same time as the labs.  Someone please call [facility] to get the results.      Patient would like to know how labs came out and would like a copy of them. Please call her when she can pick them up.

## 2024-09-26 NOTE — TELEPHONE ENCOUNTER
Clearance papers faxed to [facility], Lab results placed in front Binder for patient to , Tried calling patient, unable to L/M, mailbox full

## 2024-10-03 ENCOUNTER — TELEPHONE (OUTPATIENT)
Age: 74
End: 2024-10-03

## 2024-10-03 NOTE — TELEPHONE ENCOUNTER
Patient stopped in to get results on recent mammogram she had done at [facility] on 9/19, Mammogram has not been signed off from Radiologist, called Radiologist office and they are not sure why Mammogram was missed, they said they will look into it and hopefully get Mammogram report completed. Please advise to patient when done and seen by Dr Matos

## 2024-10-04 ENCOUNTER — TELEPHONE (OUTPATIENT)
Age: 74
End: 2024-10-04

## 2024-10-04 NOTE — TELEPHONE ENCOUNTER
Dr. Warren office called and advised that patient has surgery next billy. And she was cleared by dr. Matos.   But they noticed she has Chronic DVT diagnosis on her notes, and they wanted to make sure that it is old and not a current condition.   I was able to advise that the DX was placed on 9/20/2023.  They tried calling patient.   Please advise their office # [phone] option 1

## 2024-10-08 ENCOUNTER — TELEPHONE (OUTPATIENT)
Age: 74
End: 2024-10-08

## 2024-10-08 NOTE — TELEPHONE ENCOUNTER
Pt will need to be followed for home care by [facility]  any questions call thania  from [facility] she will fax information

## 2024-10-30 ENCOUNTER — HOSPITAL ENCOUNTER (OUTPATIENT)
Dept: PHYSICAL THERAPY | Facility: CLINIC | Age: 74
Setting detail: THERAPIES SERIES
Discharge: HOME | End: 2024-10-30
Payer: MEDICARE

## 2024-10-30 PROCEDURE — 97161 PT EVAL LOW COMPLEX 20 MIN: CPT

## 2024-10-30 PROCEDURE — 97110 THERAPEUTIC EXERCISES: CPT

## 2024-10-30 NOTE — CARE COORDINATION
Princess Fall Risk Assessment    Patient Name:  Trinh Garza  : 1950    Risk Factor Scale  Score   History of Falls [x] Yes  [] No 25  0 25   Secondary Diagnosis [] Yes  [x] No 15  0    Ambulatory Aid [] Furniture  [] Crutches/cane/walker  [x] None/bedrest/wheelchair/nurse 30  15  0    IV/Heparin Lock [] Yes  [x] No 20  0    Gait/Transferring [] Impaired  [x] Weak  [] Normal/bedrest/immobile 20  10  0 10   Mental Status [] Forgets limitations  [x] Oriented to own ability 15  0       Total:35     Based on the Assessment score: check the appropriate box.    []  No intervention needed   Low =   Score of 0-24    [x]  Use standard prevention interventions Moderate =  Score of 24-44   [] Give patient handout and discuss fall prevention strategies   [x] Establish goal of education for patient/family RE: fall prevention strategies    []  Use high risk prevention interventions High = Score of 45 and higher   [] Give patient handout and discuss fall prevention strategies   [] Establish goal of education for patient/family Re: fall prevention strategies   [] Discuss lifeline / other resources    Electronically signed by:   NORMA ODELL PT  Date: 10/30/2024

## 2024-10-30 NOTE — CONSULTS
[] [facility]  Outpatient Rehabilitation &  Therapy  [address]  [phone]  [phone] [] [facility]  Outpatient Rehabilitation &  Therapy  [address]  P: [phone]  F: [phone] [x] [facility]  Outpatient Rehabilitation &  Therapy  [address]  P: [phone]  F: [phone] [] [facility]  Outpatient Rehabilitation &  Therapy  [address]  [phone]  [phone] [] [facility]  Outpatient Rehabilitation &  Therapy  [address]   P: [phone]  F: [phone]  [] [facility]  Outpatient Rehabilitation &  Therapy  [address]  P: [phone]  F: [phone] [] [facility]  Outpatient Rehabilitation &  Therapy  [address]  P: [phone]  F: [phone] [] [facility]  Outpatient Rehabilitation &  Therapy  [address]  P: [phone]  F: [phone] [] [facility]  Outpatient Rehabilitation &  Therapy  [address]  P: [phone]  F: [phone]  [] [facility] Outpatient Rehabilitation &  Therapy  [address]  P: [phone]  F: [phone]     Physical Therapy Lower Extremity Evaluation    Date:  10/30/2024  Patient: Trinh Garza  : 1950  MRN: [id number]  Physician: NIKKI Obrien   Insurance:  MEDICARE PART A AND B (BOMN)  Medical Diagnosis: M16.12 (ICD-10-CM) - Unilateral primary osteoarthritis, left hip   Rehab Codes: M25.552, R26.89  Onset date: 10/7/24    Next Dr's appt.: 24    Subjective:   CC:72 yo female with 10+ year history of L hip pain. Pt has been seen here at PT previously and failed conservative treatment with pt still reporting pain that limited functiona and at DC from PT still demonstrated Compensated [place] gait.    She presents today

## 2024-11-05 ENCOUNTER — HOSPITAL ENCOUNTER (OUTPATIENT)
Dept: PHYSICAL THERAPY | Facility: CLINIC | Age: 74
Setting detail: THERAPIES SERIES
Discharge: HOME | End: 2024-11-05
Payer: MEDICARE

## 2024-11-05 PROCEDURE — 97016 VASOPNEUMATIC DEVICE THERAPY: CPT

## 2024-11-05 PROCEDURE — 97110 THERAPEUTIC EXERCISES: CPT

## 2024-11-05 NOTE — FLOWSHEET NOTE
[] [facility]  Outpatient Rehabilitation &  Therapy  [address]  [phone]  [phone] [] [facility]  Outpatient Rehabilitation &  Therapy  [address]  P: [phone]  F: [phone] [x] [facility]  Outpatient Rehabilitation &  Therapy  [address]  P: [phone]  F: [phone] [] [facility]  Outpatient Rehabilitation &  Therapy  [address]  [phone]  [phone] [] [facility]  Outpatient Rehabilitation &  Therapy  [address]   P: [phone]  F: [phone]  [] [facility]  Outpatient Rehabilitation &  Therapy  [address]  P: [phone]  F: [phone] [] [facility]  Outpatient Rehabilitation &  Therapy  [address]  P: [phone]  F: [phone] [] [facility]  Outpatient Rehabilitation &  Therapy  [address]  P: [phone]  F: [phone] [] [facility]  Outpatient Rehabilitation &  Therapy  [address]  P: [phone]  F: [phone]  [] [facility] Outpatient Rehabilitation &  Therapy  [address]  P: [phone]  F: [phone]     Physical Therapy Daily Treatment Note    Date:  2024  Patient Name:  Trinh Garza    :  1950  MRN: [id number]  Physician: NIKKI Obrien                              Insurance:  MEDICARE PART A AND B (BOMN)  Medical Diagnosis: M16.12 (ICD-10-CM) - Unilateral primary osteoarthritis, left hip                     Rehab Codes: M25.552, R26.89  Onset date: 10/7/24                 Next 's appt.: 24  Visit# / total visits: ; Progress note for Medicare patient due at visit 10     Cancels/No Shows: 0    Subjective:    Pain:  [x] Yes  [] No Location: L hip  Pain Rating: (0-10 scale) 2/10  Pain altered Tx:  [x] No  []

## 2024-11-08 ENCOUNTER — HOSPITAL ENCOUNTER (OUTPATIENT)
Dept: PHYSICAL THERAPY | Facility: CLINIC | Age: 74
Setting detail: THERAPIES SERIES
Discharge: HOME | End: 2024-11-08
Payer: MEDICARE

## 2024-11-08 PROCEDURE — 97110 THERAPEUTIC EXERCISES: CPT

## 2024-11-08 PROCEDURE — 97016 VASOPNEUMATIC DEVICE THERAPY: CPT

## 2024-11-08 NOTE — FLOWSHEET NOTE
[] [facility]  Outpatient Rehabilitation &  Therapy  [address]  [phone]  [phone] [] [facility]  Outpatient Rehabilitation &  Therapy  [address]  P: [phone]  F: [phone] [x] [facility]  Outpatient Rehabilitation &  Therapy  [address]  P: [phone]  F: [phone]     Physical Therapy Daily Treatment Note    Date:  2024  Patient Name:  Trinh Garza    :  1950  MRN: [id number]  Physician: NIKKI Obrien                              Insurance:  MEDICARE PART A AND B (BOMN)  Medical Diagnosis: M16.12 (ICD-10-CM) - Unilateral primary osteoarthritis, left hip                     Rehab Codes: M25.552, R26.89  Onset date: 10/7/24                 Next 's appt.: 24  Visit# / total visits: 3/12; Progress note for Medicare patient due at visit 10     Cancels/No Shows: 0    Subjective:    Pain:  [x] Yes  [] No Location: L hip  Pain Rating: (0-10 scale) 2/10  Pain altered Tx:  [x] No  [] Yes  Action:  Comments: Pt mentioned that her pain is not bad today. .     Objective:  Modalities: vaso min compression, 40 degrees x10min   Precautions [] No  [x] Yes:No flex >90, No ER >50, no adduction or IR past neutral, No crossing legs. Limit hip ext to 10 deg ([facility] protocol in chart)  Exercises:  Exercise Reps/ Time Weight/ Level Comments    Nustep  10' L3      HS/calf stretch 3x30\"               Hip flex, abd 2x10 B AROM     Alt march/HS curl 2x10 ea      Step Ups 2x10 ea 4\"     TG squats/HR 2x10 L15 Pillow                   SLR 2x10        Hip abd 2x10   sidelying    Hip adduction Isometric 10\"x15        Supine clamshell isometric  10\"x15 purple      Sidelying clamshell 2x10   Shortened ROM    Bridge w/SAQ 2x10        LAQ 2x10  3 lbs                            Other:     Specific Instructions for next treatment: Progress flexibility and strength while maintaining surgical precautions.

## 2024-11-11 NOTE — FLOWSHEET NOTE
[] [address]  [address]  [phone]  F: [phone] [] [address]  [address]  P: [phone]  F: [phone] [] [address]  [address]  P: [phone]  F: [phone] [] [facility] [place]: [phone]  F: [phone] [] [address]  [address]   [address]   P: [phone]  F: [phone]  [] [address]   P: [phone]  F: [phone] [] [address] [facility] [address] [facility]  [address]   [address]  P: [phone]  F: [phone] [] [address] [address] [facility]  [address]  [place]: [phone]  F: [phone] [] [address]  [place]: [phone]  F: [phone]      Therapy Cancel/No Show note    Date: 10/24/2023  Patient: Ludmilaett Degree  : 1950  MRN: [id number]    Cancels/No Shows to date:     For today's appointment patient:    [x]  Cancelled    [] Rescheduled appointment    [] No-show     Reason given by patient:    []  Patient ill    []  Conflicting appointment    [] No transportation      [] Conflict with work    [] No reason given    [] Weather related    [] [id number]    [] Other:      Comments:  schedule conflict      [] Next appointment was confirmed    Electronically signed by: Gillian Brown Negative

## 2024-11-12 ENCOUNTER — HOSPITAL ENCOUNTER (OUTPATIENT)
Dept: PHYSICAL THERAPY | Facility: CLINIC | Age: 74
Setting detail: THERAPIES SERIES
Discharge: HOME | End: 2024-11-12
Payer: MEDICARE

## 2024-11-12 PROCEDURE — 97110 THERAPEUTIC EXERCISES: CPT

## 2024-11-12 PROCEDURE — 97016 VASOPNEUMATIC DEVICE THERAPY: CPT

## 2024-11-12 NOTE — FLOWSHEET NOTE
[] [facility]  Outpatient Rehabilitation &  Therapy  [address]  [phone]  [phone] [] [facility]  Outpatient Rehabilitation &  Therapy  [address]  P: [phone]  F: [phone] [x] [facility]  Outpatient Rehabilitation &  Therapy  [address]  P: [phone]  F: [phone]     Physical Therapy Daily Treatment Note    Date:  2024  Patient Name:  Trinh Garza    :  1950  MRN: [id number]  Physician: NIKKI Obrien                              Insurance:  MEDICARE PART A AND B (BOMN)  Medical Diagnosis: M16.12 (ICD-10-CM) - Unilateral primary osteoarthritis, left hip                     Rehab Codes: M25.552, R26.89  Onset date: 10/7/24                 Next 's appt.: 24  Visit# / total visits: ; Progress note for Medicare patient due at visit 10     Cancels/No Shows: 0    Subjective:    Pain:  [x] Yes  [] No Location: L hip  Pain Rating: (0-10 scale) 1/10  Pain altered Tx:  [x] No  [] Yes  Action:  Comments: Pt stated that she is feeling good and keeping up with her HEP.     Objective:  Modalities: vaso min compression, 40 degrees x10min   Precautions [] No  [x] Yes:No flex >90, No ER >50, no adduction or IR past neutral, No crossing legs. Limit hip ext to 10 deg ([facility] protocol in chart)  Exercises:  Exercise Reps/ Time Weight/ Level Comments    Nustep  10' L3   x   HS/calf stretch 3x30\"     x          3-way hip 2x10 B Lime   x   Alt march/HS curl 3x10 ea      Step Ups 2x10 ea 8\"  x   TG squats/HR 3x10 L19 Pillow  x   Lunges  2x10   x          SLR 3x10     x   Hip abd 2x10  sidelying    Side lying clamshell 2x10      Hip adduction Isometric 10\"x15        Supine clamshell isometric  10\"x15 purple      Sidelying clamshell 2x10   Shortened ROM    Bridge w/SAQ 2x10        LAQ 2x10  3 lbs                            Other:     Specific Instructions for next treatment: Progress

## 2024-11-15 ENCOUNTER — HOSPITAL ENCOUNTER (OUTPATIENT)
Dept: PHYSICAL THERAPY | Facility: CLINIC | Age: 74
Setting detail: THERAPIES SERIES
Discharge: HOME | End: 2024-11-15
Payer: MEDICARE

## 2024-11-15 PROCEDURE — 97016 VASOPNEUMATIC DEVICE THERAPY: CPT

## 2024-11-15 PROCEDURE — 97110 THERAPEUTIC EXERCISES: CPT

## 2024-11-15 NOTE — FLOWSHEET NOTE
[] [facility]  Outpatient Rehabilitation &  Therapy  [address]  [phone]  [phone] [] [facility]  Outpatient Rehabilitation &  Therapy  [address]  P: [phone]  F: [phone] [x] [facility]  Outpatient Rehabilitation &  Therapy  [address]  P: [phone]  F: [phone]     Physical Therapy Daily Treatment Note    Date:  11/15/2024  Patient Name:  Trinh Garza    :  1950  MRN: [id number]  Physician: NIKKI Obrien                              Insurance:  MEDICARE PART A AND B (BOMN)  Medical Diagnosis: M16.12 (ICD-10-CM) - Unilateral primary osteoarthritis, left hip                     Rehab Codes: M25.552, R26.89  Onset date: 10/7/24                 Next 's appt.: 24  Visit# / total visits: ; Progress note for Medicare patient due at visit 10     Cancels/No Shows: 0    Subjective:    Pain:  [] Yes  [x] No Location: L hip  Pain Rating: (0-10 scale) 010  Pain altered Tx:  [x] No  [] Yes  Action:  Comments: Pt reports no pain at this time. Occasional aching over incision    Objective:  Modalities: vaso min compression, 40 degrees x10min   Precautions [] No  [x] Yes:No flex >90, No ER >50, no adduction or IR past neutral, No crossing legs. Limit hip ext to 10 deg ([facility] protocol in chart)  Exercises:  Exercise Reps/ Time Weight/ Level Comments    Nustep  10' L3   x   HS/calf stretch 3x30\"     x          3-way hip 2x10 B Lime   x   Alt march/HS curl 3x10 ea      Step Ups 2x10 ea 8\" Fwd and lat x   TG squats/HR 3x10 L19 Pillow  x   Lunges  2x10   x          SLR 3x10     x   Hip abd 2x10  sidelying    Side lying clamshell 2x10      Hip adduction Isometric 10\"x15        Supine clamshell isometric  10\"x15 purple      Sidelying clamshell 2x10   Shortened ROM    Bridge w/SAQ 2x10        LAQ 2x10  3 lbs                            Other:     Specific Instructions for next treatment:

## 2024-11-19 ENCOUNTER — HOSPITAL ENCOUNTER (OUTPATIENT)
Dept: PHYSICAL THERAPY | Facility: CLINIC | Age: 74
Setting detail: THERAPIES SERIES
Discharge: HOME | End: 2024-11-19
Payer: MEDICARE

## 2024-11-19 PROCEDURE — 97016 VASOPNEUMATIC DEVICE THERAPY: CPT

## 2024-11-19 PROCEDURE — 97110 THERAPEUTIC EXERCISES: CPT

## 2024-11-19 NOTE — FLOWSHEET NOTE
[] [facility]  Outpatient Rehabilitation &  Therapy  [address]  [phone]  [phone] [] [facility]  Outpatient Rehabilitation &  Therapy  [address]  P: [phone]  F: [phone] [x] [facility]  Outpatient Rehabilitation &  Therapy  [address]  P: [phone]  F: [phone]     Physical Therapy Daily Treatment Note    Date:  2024  Patient Name:  Trinh Garza    :  1950  MRN: [id number]  Physician: NIKKI Obrien                              Insurance:  MEDICARE PART A AND B (BOMN)  Medical Diagnosis: M16.12 (ICD-10-CM) - Unilateral primary osteoarthritis, left hip                     Rehab Codes: M25.552, R26.89  Onset date: 10/7/24                 Next 's appt.: Mid December  Visit# / total visits: ; Progress note for Medicare patient due at visit 10     Cancels/No Shows: 0    Subjective:    Pain:  [] Yes  [x] No Location: L hip  Pain Rating: (0-10 scale) 010  Pain altered Tx:  [x] No  [] Yes  Action:  Comments: Pt had f/u with surgeon who told her things are healing well and to continue with PT. She has a few small sections of her incision that are not yet fully healed and they told her once they are she may start getting in the pool again.     Objective:  Modalities: vaso min compression, 40 degrees x15min   Precautions [] No  [x] Yes:No flex >90, No ER >50, no adduction or IR past neutral, No crossing legs. Limit hip ext to 10 deg ([facility] protocol in chart)  Exercises:  Exercise Reps/ Time Weight/ Level Comments    Nustep  10' L3   x   HS/calf stretch 3x30\"     x          3-way hip 2x10 B Lime   x   Step Ups 2x10 ea 8\" Fwd and lat x   TG squats/HR 3x10 L19 Pillow  x   Lunges  2x10   x          SLR 3x10     x   Hip abd 2x10  sidelying x   Side lying clamshell 2x10      Hip adduction Isometric 10\"x15        Supine clamshell isometric  10\"x15 purple      Sidelying clamshell 2x10

## 2024-11-21 ENCOUNTER — HOSPITAL ENCOUNTER (OUTPATIENT)
Dept: PHYSICAL THERAPY | Facility: CLINIC | Age: 74
Setting detail: THERAPIES SERIES
Discharge: HOME | End: 2024-11-21
Payer: MEDICARE

## 2024-11-21 PROCEDURE — 97016 VASOPNEUMATIC DEVICE THERAPY: CPT

## 2024-11-21 PROCEDURE — 97110 THERAPEUTIC EXERCISES: CPT

## 2024-11-21 NOTE — FLOWSHEET NOTE
Instructions for next treatment: Progress flexibility and strength while maintaining surgical precautions. Educate on scar massage once healed     Treatment Charges: Mins Units   []  Modalities       [x]  Ther Exercise 40 3   []  Neuromuscular Re-ed     []  Gait Training     []  Manual Therapy     []  Ther Activities     []  Aquatics     [x]  Vasocompression 15 1   []  Cervical Traction     []  Other     Total Billable time 55 min 4      Assessment: [x] Progressing toward goals. Increased resistance on Nu Step with no complaints. Continued with current program to enhance LE strength, mobility and stamina. Added lat sidestepping for further G med strengthening; cues provided to maintain distance between the LE's and avoid dragging the opposite foot. Standing rest breaks utilized secondary to fatigue. Increased resistance with TG ex with good tolerance. Ended with vaso for symptom management.     [] No change.     [] Other:  [x] Patient would continue to benefit from skilled physical therapy services in order to: meet goals listed below    STG: (to be met in 6 treatments)  ? Pain:<3/10 pain L hip to allow for improved ability to lift L LE   ? ROM: hip extension 10 deg to improve R step length with gait  ? Strength:4/5 or better hip ext,abd, flexion  ? Function:LEFI <30% functional limitation  Patient to be independent with home exercise program as demonstrated by performance with correct form without cues.  Demonstrate Knowledge of fall prevention  LTG: (to be met in 12 treatments)  5/5 hip flex, abd , Ext strength to normalize gait  Good eccentric quad control on 6 inch step to allow for reciprocal stair negotiation with 1 hand support  Normal gait without device WBAT 500ft x1 IND in order n=to negotiate community with less difficulty  LEFI <25% limitation                    Patient goals:relieve pain,walk normal and get endurance back    Pt. Education:  [] Yes  [] No  [x] Reviewed Prior HEP/Ed  Method of Education:

## 2024-11-22 ENCOUNTER — APPOINTMENT (OUTPATIENT)
Dept: PHYSICAL THERAPY | Facility: CLINIC | Age: 74
End: 2024-11-22
Payer: MEDICARE

## 2024-11-25 ENCOUNTER — HOSPITAL ENCOUNTER (OUTPATIENT)
Dept: PHYSICAL THERAPY | Facility: CLINIC | Age: 74
Setting detail: THERAPIES SERIES
Discharge: HOME | End: 2024-11-25
Payer: MEDICARE

## 2024-11-25 PROCEDURE — 97110 THERAPEUTIC EXERCISES: CPT

## 2024-11-25 PROCEDURE — 97016 VASOPNEUMATIC DEVICE THERAPY: CPT

## 2024-11-25 NOTE — FLOWSHEET NOTE
massage once healed     Treatment Charges: Mins Units   []  Modalities       [x]  Ther Exercise 40 3   []  Neuromuscular Re-ed     []  Gait Training     []  Manual Therapy     []  Ther Activities     []  Aquatics     [x]  Vasocompression 15 1   []  Cervical Traction     []  Other     Total Billable time 55 min 4      Assessment: [x] Progressing toward goals. Able to advance 3-way hip with increased reps to x30 bilaterally to work further into hip strength and stability. Added lime band to hip exercises to further create strength. Increased TG level to 22 with no issues. Pt more cardio fatigued through out session. Finished with vaso.     [] No change.     [] Other:  [x] Patient would continue to benefit from skilled physical therapy services in order to: meet goals listed below    STG: (to be met in 6 treatments)  ? Pain:<3/10 pain L hip to allow for improved ability to lift L LE   ? ROM: hip extension 10 deg to improve R step length with gait  ? Strength:4/5 or better hip ext,abd, flexion  ? Function:LEFI <30% functional limitation  Patient to be independent with home exercise program as demonstrated by performance with correct form without cues.  Demonstrate Knowledge of fall prevention  LTG: (to be met in 12 treatments)  5/5 hip flex, abd , Ext strength to normalize gait  Good eccentric quad control on 6 inch step to allow for reciprocal stair negotiation with 1 hand support  Normal gait without device WBAT 500ft x1 IND in order n=to negotiate community with less difficulty  LEFI <25% limitation                    Patient goals:relieve pain,walk normal and get endurance back    Pt. Education:  [] Yes  [] No  [x] Reviewed Prior HEP/Ed  Method of Education: [x] Verbal  [] Demo  [x] Written  Comprehension of Education:  [x] Verbalizes understanding.  [] Demonstrates understanding.  [] Needs review.  [] Demonstrates/verbalizes HEP/Ed previously given.     Plan: [x] Continue current frequency toward long and short

## 2024-11-29 ENCOUNTER — HOSPITAL ENCOUNTER (OUTPATIENT)
Dept: PHYSICAL THERAPY | Facility: CLINIC | Age: 74
Setting detail: THERAPIES SERIES
Discharge: HOME | End: 2024-11-29
Payer: MEDICARE

## 2024-11-29 PROCEDURE — 97110 THERAPEUTIC EXERCISES: CPT

## 2024-11-29 NOTE — FLOWSHEET NOTE
[] [facility]  Outpatient Rehabilitation &  Therapy  [address]  [phone]  [phone] [] [facility]  Outpatient Rehabilitation &  Therapy  [address]  P: [phone]  F: [phone] [x] [facility]  Outpatient Rehabilitation &  Therapy  [address]  P: [phone]  F: [phone]     Physical Therapy Daily Treatment Note    Date:  2024  Patient Name:  Trinh Garza    :  1950  MRN: [id number]  Physician: NIKKI Obrien                              Insurance:  MEDICARE PART A AND B (BOMN)  Medical Diagnosis: M16.12 (ICD-10-CM) - Unilateral primary osteoarthritis, left hip                     Rehab Codes: M25.552, R26.89  Onset date: 10/7/24                 Next 's appt.: Mid December  Visit# / total visits: ; Progress note for Medicare patient due at visit 10     Cancels/No Shows: 0    Subjective:    Pain:  [] Yes  [x] No Location: L hip  Pain Rating: (0-10 scale) 0/10  Pain altered Tx:  [x] No  [] Yes  Action:  Comments: Pt states she feels good, no pain     Objective:  Modalities: vaso min compression, 40 degrees x15min   Precautions [] No  [x] Yes:No flex >90, No ER >50, no adduction or IR past neutral, No crossing legs. Limit hip ext to 10 deg ([facility] protocol in chart)  Exercises:  Exercise Reps/ Time Weight/ Level Comments    NuStep 10' L 5  held due to availability     HS/calf stretch 3x30\"     x          3-way hip 3x10 B Lime   x   Step Ups 2x10 ea 8\" Fwd and lat x   Lateral heel taps 2x10 4\"  x   TG squats/HR 3x10 L22 Pillow  x   Lunges  2x10   x   Lateral Sidestepping  4L Lime  In // bars x   PMT knee ext x10 15#  x   PMT knee flex 2x10 25#  x          SLR 3x10        Hip abd 2x10 Lime  sidelying    Sidelying clamshell 2x10 Lime  Shortened ROM    Bridge with band  3x10 Lime    x   LAQ 2x10  3 lbs                            Other:     Specific Instructions for next treatment:

## 2024-12-02 ENCOUNTER — HOSPITAL ENCOUNTER (OUTPATIENT)
Dept: PHYSICAL THERAPY | Facility: CLINIC | Age: 74
Setting detail: THERAPIES SERIES
Discharge: HOME | End: 2024-12-02
Payer: MEDICARE

## 2024-12-02 PROCEDURE — 97140 MANUAL THERAPY 1/> REGIONS: CPT

## 2024-12-02 PROCEDURE — 97110 THERAPEUTIC EXERCISES: CPT

## 2024-12-02 NOTE — PROGRESS NOTES
[] [facility]  Outpatient Rehabilitation &  Therapy  [address]  [phone]  [phone] [] [facility]  Outpatient Rehabilitation &  Therapy  [address]  P: [phone]  F: [phone] [x] [facility]  Outpatient Rehabilitation &  Therapy  [address]  P: [phone]  F: [phone] [] [facility]  Outpatient Rehabilitation &  Therapy  [address]  [phone]  [phone] [] [facility]  Outpatient Rehabilitation &  Therapy  [address]   P: [phone]  F: [phone]  [] [facility]  Outpatient Rehabilitation &  Therapy  [address]  P: [phone]  F: [phone] [] [facility]  Outpatient Rehabilitation &  Therapy  [address]  P: [phone]  F: [phone] [] [facility]  Outpatient Rehabilitation &  Therapy  [address]  P: [phone]  F: [phone] [] [facility]  Outpatient Rehabilitation &  Therapy  [address]  P: [phone]  F: [phone]  [] [facility] Outpatient Rehabilitation &  Therapy  [address]  P: [phone]  F: [phone]     Physical Therapy Progress Note    Date: 2024      Patient: Trinh Garza  : 1950  MRN: [id number]    Physician: NIKKI Obrien                              Insurance:  MEDICARE PART A AND B (BOMN)  Medical Diagnosis: M16.12 (ICD-10-CM) - Unilateral primary osteoarthritis, left hip                     Rehab Codes: M25.552, R26.89  Onset date: 10/7/24                 Next 's appt.:   Visit# / total visits: ; Progress note for Medicare patient due at visit 10                                    Cancels/No Shows: 0  Date range of services: 10/30/24 to 24      Subjective:  Pain:  [] Yes  [] No  Location:

## 2024-12-02 NOTE — FLOWSHEET NOTE
[] [facility]  Outpatient Rehabilitation &  Therapy  [address]  [phone]  [phone] [] [facility]  Outpatient Rehabilitation &  Therapy  [address]  P: [phone]  F: [phone] [x] [facility]  Outpatient Rehabilitation &  Therapy  [address]  P: [phone]  F: [phone]     Physical Therapy Daily Treatment Note    Date:  2024  Patient Name:  Trinh Garza    :  1950  MRN: [id number]  Physician: NIKKI Obrien                              Insurance:  MEDICARE PART A AND B (BOMN)  Medical Diagnosis: M16.12 (ICD-10-CM) - Unilateral primary osteoarthritis, left hip                     Rehab Codes: M25.552, R26.89  Onset date: 10/7/24                 Next 's appt.: Mid December( or )  Visit# / total visits: 10/12; Progress note for Medicare patient due at visit 10     Cancels/No Shows: 0    Subjective:    Pain:  [] Yes  [x] No Location: L hip  Pain Rating: (0-10 scale) 0/10  Pain altered Tx:  [x] No  [] Yes  Action:  Comments: Pt states she feels good. Walking is going well but stairs are not good yet. No pain in the hip. Occasional knee discomfort and stiffness.     Objective:  Modalities: vaso min compression, 40 degrees x15min   Precautions [] No  [x] Yes:precautions still in place for rotations and extension  Exercises:  Exercise Reps/ Time Weight/ Level Comments    NuStep 10' L 5   x   HS/calf stretch 3x30\"     x          4-way hip 3x10 B Lime   x   Step Ups 2x10 ea 6\" Fwd and lat x   Lateral heel taps 2x10 4\"  x   TG squats/HR 3x10 L22 Pillow  x   Split squat 2x10   x   Lateral Sidestepping  6L Blue In // bars x   PMT knee ext x10 15#     PMT knee flex 2x10 25#            SLR 3x10        Hip abd 3x10  sidelying x   Sidelying clamshell 2x10 Lime  Shortened ROM    Bridge with band  3x10 Lime       LAQ 2x10  3 lbs                            Other:     Specific Instructions

## 2024-12-05 ENCOUNTER — APPOINTMENT (OUTPATIENT)
Dept: PHYSICAL THERAPY | Facility: CLINIC | Age: 74
End: 2024-12-05
Payer: MEDICARE

## 2024-12-10 ENCOUNTER — HOSPITAL ENCOUNTER (OUTPATIENT)
Dept: PHYSICAL THERAPY | Facility: CLINIC | Age: 74
Setting detail: THERAPIES SERIES
Discharge: HOME | End: 2024-12-10
Payer: MEDICARE

## 2024-12-10 PROCEDURE — 97110 THERAPEUTIC EXERCISES: CPT

## 2024-12-20 ENCOUNTER — HOSPITAL ENCOUNTER (OUTPATIENT)
Dept: PHYSICAL THERAPY | Facility: CLINIC | Age: 74
Setting detail: THERAPIES SERIES
Discharge: HOME | End: 2024-12-20
Payer: MEDICARE

## 2024-12-20 PROCEDURE — 97110 THERAPEUTIC EXERCISES: CPT

## 2024-12-20 NOTE — FLOWSHEET NOTE
Activities     []  Aquatics     []  Vasocompression     []  Cervical Traction     []  Other     Total Billable time 40 3      Assessment:   Pt is walking normally without device. She has gained strength as noted above however is still lacking hip extension strength. Goals progress as below. Will DC to IND HEP at this time and pt is to continue with aquatic classes at [facility] 2x/week   STG: (to be met in 6 treatments)  ? Pain:<3/10 pain L hip to allow for improved ability to lift L LE  Met  ? ROM: hip extension 10 deg to improve R step length with gait Met  ? Strength:4/5 or better hip ext,abd, flexion Partially met  ? Function:LEFI <30% functional limitation Not met at 37% limitation progressed from 46% limitation  Patient to be independent with home exercise program as demonstrated by performance with correct form without cues. Met  Demonstrate Knowledge of fall prevention  Met  LTG: (to be met in 12 treatments)  5/5 hip flex, abd , Ext strength to normalize gait Not Met  Good eccentric quad control on 6 inch step to allow for reciprocal stair negotiation with 1 hand support+  Normal gait without device WBAT 500ft x1 IND in order n=to negotiate community with less difficulty Met  LEFI <25% limitation Not met at 34% limitation progressed from 46% limitation at eval                    Patient goals:relieve pain,walk normal and get endurance back    Pt. Education:  [] Yes  [] No  [x] Reviewed Prior HEP/Ed  Method of Education: [x] Verbal  [] Demo  [] Written  Access Code: [email or web address]  URL: [email or web address]  Date: 12/02/2024  Prepared by: Ailin Giles    Exercises  - Lateral Step Down  - 1 x daily - 7 x weekly - 3 sets - 10 reps  - Side Stepping with Resistance at Thighs  - 1 x daily - 7 x weekly - 3 sets - 10 reps  Comprehension of Education:  [x] Verbalizes understanding.  [] Demonstrates understanding.  [] Needs review.  [] Demonstrates/verbalizes HEP/Ed previously given.     Plan: [x] Continue current

## 2024-12-20 NOTE — DISCHARGE SUMMARY
[] [facility]  Outpatient Rehabilitation &  Therapy  [address]  [phone]  [phone] [] [facility]  Outpatient Rehabilitation &  Therapy  [address]  P: [phone]  F: [phone] [x] [facility]  Outpatient Rehabilitation &  Therapy  [address]  P: [phone]  F: [phone] [] [facility]  Outpatient Rehabilitation &  Therapy  [address]  [phone]  [phone] [] [facility]  Outpatient Rehabilitation &  Therapy  [address]   P: [phone]  F: [phone]  [] [facility]  Outpatient Rehabilitation &  Therapy  [address]  P: [phone]  F: [phone] [] [facility]  Outpatient Rehabilitation &  Therapy  [address]  P: [phone]  F: [phone] [] [facility]  Outpatient Rehabilitation &  Therapy  [address] [place]  P: [phone]  F: [phone] [] [facility]  Outpatient Rehabilitation &  Therapy  [address]  P: [phone]  F: [phone]  [] [facility] Outpatient Rehabilitation &  Therapy  [address]  P: [phone]  F: [phone]     Physical Therapy Discharge    Date: 2024      Patient: Trinh Garza  : 1950  MRN: [id number]    Physician: NIKKI Obrien                              Insurance:  MEDICARE PART A AND B (BOMN)  Medical Diagnosis: M16.12 (ICD-10-CM) - Unilateral primary osteoarthritis, left hip                     Rehab Codes: M25.552, R26.89  Onset date: 10/7/24                 Next 's appt.:   Visit# / total visits:                                 Cancels/No Shows: 0  Date range of services: 10/30/24 to 24      Subjective:  Pain:  [] Yes  [x] No  Location: L hip  Pain Rating: (0-10 scale) 0/10  Pain altered Tx:

## 2025-03-10 ENCOUNTER — HOSPITAL ENCOUNTER (OUTPATIENT)
Age: 75
Setting detail: SPECIMEN
Discharge: HOME | End: 2025-03-10

## 2025-03-10 ENCOUNTER — OFFICE VISIT (OUTPATIENT)
Age: 75
End: 2025-03-10

## 2025-03-10 VITALS
WEIGHT: 248.8 LBS | SYSTOLIC BLOOD PRESSURE: 136 MMHG | HEART RATE: 88 BPM | DIASTOLIC BLOOD PRESSURE: 78 MMHG | BODY MASS INDEX: 42.71 KG/M2 | TEMPERATURE: 98.1 F | OXYGEN SATURATION: 99 %

## 2025-03-10 DIAGNOSIS — E78.2 MIXED HYPERLIPIDEMIA: ICD-10-CM

## 2025-03-10 DIAGNOSIS — M15.9 OSTEOARTHRITIS OF MULTIPLE JOINTS, UNSPECIFIED OSTEOARTHRITIS TYPE: ICD-10-CM

## 2025-03-10 DIAGNOSIS — I10 ESSENTIAL HYPERTENSION: Primary | ICD-10-CM

## 2025-03-10 DIAGNOSIS — F32.1 CURRENT MODERATE EPISODE OF MAJOR DEPRESSIVE DISORDER WITHOUT PRIOR EPISODE (HCC): ICD-10-CM

## 2025-03-10 DIAGNOSIS — E55.9 VITAMIN D DEFICIENCY: ICD-10-CM

## 2025-03-10 DIAGNOSIS — Z91.89 AT HIGH RISK FOR BREAST CANCER: ICD-10-CM

## 2025-03-10 DIAGNOSIS — Z12.39 ENCOUNTER FOR BREAST CANCER SCREENING USING NON-MAMMOGRAM MODALITY: ICD-10-CM

## 2025-03-10 LAB
25(OH)D3 SERPL-MCNC: 37 NG/ML (ref 30–100)
ALBUMIN SERPL-MCNC: 4.4 G/DL (ref 3.5–5.2)
ALBUMIN/GLOB SERPL: 1.6 {RATIO} (ref 1–2.5)
ALP SERPL-CCNC: 88 U/L (ref 35–104)
ALT SERPL-CCNC: 14 U/L (ref 10–35)
ANION GAP SERPL CALCULATED.3IONS-SCNC: 12 MMOL/L (ref 9–16)
AST SERPL-CCNC: 20 U/L (ref 10–35)
BASOPHILS # BLD: 0.06 K/UL (ref 0–0.2)
BASOPHILS NFR BLD: 1 % (ref 0–2)
BILIRUB SERPL-MCNC: 0.4 MG/DL (ref 0–1.2)
BUN SERPL-MCNC: 14 MG/DL (ref 8–23)
CALCIUM SERPL-MCNC: 9.4 MG/DL (ref 8.6–10.4)
CHLORIDE SERPL-SCNC: 105 MMOL/L (ref 98–107)
CHOLEST SERPL-MCNC: 180 MG/DL (ref 0–199)
CHOLESTEROL/HDL RATIO: 3
CO2 SERPL-SCNC: 25 MMOL/L (ref 20–31)
CREAT SERPL-MCNC: 0.7 MG/DL (ref 0.6–0.9)
EOSINOPHIL # BLD: 0.07 K/UL (ref 0–0.44)
EOSINOPHILS RELATIVE PERCENT: 1 % (ref 1–4)
ERYTHROCYTE [DISTWIDTH] IN BLOOD BY AUTOMATED COUNT: 14.7 % (ref 11.8–14.4)
GFR, ESTIMATED: >90 ML/MIN/1.73M2
GLUCOSE SERPL-MCNC: 84 MG/DL (ref 74–99)
HCT VFR BLD AUTO: 43.1 % (ref 36.3–47.1)
HDLC SERPL-MCNC: 60 MG/DL
HGB BLD-MCNC: 13.5 G/DL (ref 11.9–15.1)
IMM GRANULOCYTES # BLD AUTO: 0.04 K/UL (ref 0–0.3)
IMM GRANULOCYTES NFR BLD: 1 %
LDLC SERPL CALC-MCNC: 90 MG/DL (ref 0–100)
LYMPHOCYTES NFR BLD: 2.34 K/UL (ref 1.1–3.7)
LYMPHOCYTES RELATIVE PERCENT: 34 % (ref 24–43)
MCH RBC QN AUTO: 27.8 PG (ref 25.2–33.5)
MCHC RBC AUTO-ENTMCNC: 31.3 G/DL (ref 28.4–34.8)
MCV RBC AUTO: 88.7 FL (ref 82.6–102.9)
MONOCYTES NFR BLD: 0.7 K/UL (ref 0.1–1.2)
MONOCYTES NFR BLD: 10 % (ref 3–12)
NEUTROPHILS NFR BLD: 53 % (ref 36–65)
NEUTS SEG NFR BLD: 3.64 K/UL (ref 1.5–8.1)
NRBC BLD-RTO: 0 PER 100 WBC
PLATELET # BLD AUTO: 230 K/UL (ref 138–453)
PMV BLD AUTO: 11.2 FL (ref 8.1–13.5)
POTASSIUM SERPL-SCNC: 5.1 MMOL/L (ref 3.7–5.3)
PROT SERPL-MCNC: 7.2 G/DL (ref 6.6–8.7)
RBC # BLD AUTO: 4.86 M/UL (ref 3.95–5.11)
RBC # BLD: ABNORMAL 10*6/UL
SODIUM SERPL-SCNC: 142 MMOL/L (ref 136–145)
T4 FREE SERPL-MCNC: 1.1 NG/DL (ref 0.92–1.68)
TRIGL SERPL-MCNC: 152 MG/DL
TSH SERPL DL<=0.05 MIU/L-ACNC: 1.81 UIU/ML (ref 0.27–4.2)
VLDLC SERPL CALC-MCNC: 30 MG/DL (ref 1–30)
WBC OTHER # BLD: 6.9 K/UL (ref 3.5–11.3)

## 2025-03-10 RX ORDER — BUPROPION HYDROCHLORIDE 150 MG/1
150 TABLET ORAL EVERY MORNING
Qty: 30 TABLET | Refills: 3 | Status: SHIPPED | OUTPATIENT
Start: 2025-03-10

## 2025-03-10 RX ORDER — MULTIVITAMIN WITH IRON
1 TABLET ORAL DAILY
COMMUNITY
End: 2025-03-10

## 2025-03-10 SDOH — ECONOMIC STABILITY: FOOD INSECURITY: WITHIN THE PAST 12 MONTHS, THE FOOD YOU BOUGHT JUST DIDN'T LAST AND YOU DIDN'T HAVE MONEY TO GET MORE.: NEVER TRUE

## 2025-03-10 SDOH — ECONOMIC STABILITY: FOOD INSECURITY: WITHIN THE PAST 12 MONTHS, YOU WORRIED THAT YOUR FOOD WOULD RUN OUT BEFORE YOU GOT MONEY TO BUY MORE.: NEVER TRUE

## 2025-03-10 ASSESSMENT — PATIENT HEALTH QUESTIONNAIRE - PHQ9
SUM OF ALL RESPONSES TO PHQ QUESTIONS 1-9: 6
SUM OF ALL RESPONSES TO PHQ QUESTIONS 1-9: 6
7. TROUBLE CONCENTRATING ON THINGS, SUCH AS READING THE NEWSPAPER OR WATCHING TELEVISION: NOT AT ALL
SUM OF ALL RESPONSES TO PHQ QUESTIONS 1-9: 6
5. POOR APPETITE OR OVEREATING: NOT AT ALL
6. FEELING BAD ABOUT YOURSELF - OR THAT YOU ARE A FAILURE OR HAVE LET YOURSELF OR YOUR FAMILY DOWN: NOT AT ALL
4. FEELING TIRED OR HAVING LITTLE ENERGY: NOT AT ALL
3. TROUBLE FALLING OR STAYING ASLEEP: NOT AT ALL
2. FEELING DOWN, DEPRESSED OR HOPELESS: NEARLY EVERY DAY
8. MOVING OR SPEAKING SO SLOWLY THAT OTHER PEOPLE COULD HAVE NOTICED. OR THE OPPOSITE, BEING SO FIGETY OR RESTLESS THAT YOU HAVE BEEN MOVING AROUND A LOT MORE THAN USUAL: NOT AT ALL
1. LITTLE INTEREST OR PLEASURE IN DOING THINGS: NEARLY EVERY DAY
10. IF YOU CHECKED OFF ANY PROBLEMS, HOW DIFFICULT HAVE THESE PROBLEMS MADE IT FOR YOU TO DO YOUR WORK, TAKE CARE OF THINGS AT HOME, OR GET ALONG WITH OTHER PEOPLE: SOMEWHAT DIFFICULT
SUM OF ALL RESPONSES TO PHQ QUESTIONS 1-9: 6
9. THOUGHTS THAT YOU WOULD BE BETTER OFF DEAD, OR OF HURTING YOURSELF: NOT AT ALL

## 2025-03-10 NOTE — PROGRESS NOTES
[facility]  [facility] MEDICINE  [address]  [place] [address]  Dept: [phone]     Date of Visit:  3/10/2025  Patient Name: Trinh Garza   Patient :  1950     CHIEF COMPLAINT/HPI:     Chief Complaint   Patient presents with    Discuss Labs     Labs not done patient has fasted.         HPI      Trinh Garza, 74 y.o. presents today for a follow up of hypertension, vitamin D deficiency, anxiety, and osteoarthritis, She had her left hip done since last seen. Recommended genetic testing for family history of breast cancer as well as routine mammograms and MRIs, most recent mammogram was in 2024. She had a CBC and CMP, and EKG in September prior to her surgery. She has been well. She notes she is traveling to [place] soon.     She has recovered well from her left hip surgery. She reports occasional right hip pain which is manageable. She is more mobile since surgery. She has had BL TKA.      She notes her energy levels seem normal for her age. She completes water therapy which she benefits from.     She continues to experience stress due to her 's dementia. This causes her to be emotional. She continues to see a counselor twice monthly with benefit. She is not currently taking any medications for this issue. She does not feel she is a nervous person.     She reports post nasal drainage in the morning which resolves as the day goes on. She is not taking an allergy medication or using Flonase.    She supplements vitamin D and takes a multivitamin daily.     She denies headaches, dizziness, syncope, chest pain, shortness of breath, nausea, vomiting, diarrhea, constipation, blood in her stool, edema, claudication, skin changes, vision changes, fever, or chills.      REVIEW OF SYSTEM      Review of Systems:   Constitutional:  Negative for chills, fatigue, fever and unexpected weight change.   Eyes:  Negative for visual disturbance.

## 2025-03-11 ENCOUNTER — RESULTS FOLLOW-UP (OUTPATIENT)
Age: 75
End: 2025-03-11

## 2025-03-13 ENCOUNTER — OFFICE VISIT (OUTPATIENT)
Age: 75
End: 2025-03-13

## 2025-03-13 VITALS
HEART RATE: 98 BPM | TEMPERATURE: 100.4 F | WEIGHT: 233.4 LBS | BODY MASS INDEX: 40.06 KG/M2 | DIASTOLIC BLOOD PRESSURE: 84 MMHG | OXYGEN SATURATION: 95 % | SYSTOLIC BLOOD PRESSURE: 136 MMHG

## 2025-03-13 DIAGNOSIS — R50.9 FEVER, UNSPECIFIED FEVER CAUSE: ICD-10-CM

## 2025-03-13 DIAGNOSIS — R52 BODY ACHES: Primary | ICD-10-CM

## 2025-03-13 RX ORDER — OSELTAMIVIR PHOSPHATE 75 MG/1
75 CAPSULE ORAL 2 TIMES DAILY
Qty: 10 CAPSULE | Refills: 0 | Status: SHIPPED | OUTPATIENT
Start: 2025-03-13 | End: 2025-03-18

## 2025-03-13 ASSESSMENT — ENCOUNTER SYMPTOMS
CONSTIPATION: 0
VOMITING: 0
COUGH: 1
CHEST TIGHTNESS: 0
SORE THROAT: 1
RHINORRHEA: 0
DIARRHEA: 0
NAUSEA: 0
SHORTNESS OF BREATH: 0

## 2025-03-13 NOTE — PROGRESS NOTES
Trinh Garza (:  1950) is a 74 y.o. female, Established patient, here for evaluation of the following chief complaint(s):  Congestion (Body aches, congestion, headache, fever started last evening.  COVID, Flu A/B all negative on home tests.  Coughing with nasal drainage.)         Assessment & Plan  1. Influenza.  Her symptoms, including fever, cough, body aches, and nausea, are consistent with a diagnosis of influenza. She took a home COVID-19 and influenza test, but it was negative. Heart and lung sounds are normal, and there is no indication of pneumonia. She is advised to take Tylenol 1000 mg and Motrin 600 mg simultaneously, 2 to 3 times daily for 3 days, and then as needed for body aches. A prescription for Tamiflu 75 mg, to be taken twice daily for 5 days, has been provided. The potential side effects of Tamiflu, including neurologic side effects like hallucinations and confusion, as well as nausea, have been discussed. She is instructed to discontinue Tamiflu if she experiences any severe side effects. She is also advised to wear a mask while traveling and to seek medical attention at an urgent care facility if her condition worsens during her trip to [place].    PROCEDURE  The patient has undergone two knee replacements and a hip replacement in the past.    Results  Laboratory Studies  Home COVID-19 and influenza tests were negative.  1. Body aches  2. Fever, unspecified fever cause    No follow-ups on file.       Subjective   History of Present Illness  The patient presents for evaluation of influenza.    She was in good health during her regular checkup on Monday. However, she began experiencing symptoms indicative of influenza yesterday afternoon. These symptoms include a cough that worsened last night, generalized body aches, and a fever of 100.4 degrees Fahrenheit. She also reported nausea, which she initially attributed to the recent initiation of Wellbutrin therapy. Additionally, she

## 2025-03-31 ENCOUNTER — TELEPHONE (OUTPATIENT)
Age: 75
End: 2025-03-31

## 2025-03-31 DIAGNOSIS — Z80.3 FAMILY HISTORY OF BREAST CANCER: ICD-10-CM

## 2025-03-31 DIAGNOSIS — N64.89 OTHER SPECIFIED DISORDERS OF BREAST: ICD-10-CM

## 2025-03-31 DIAGNOSIS — Z91.89 AT HIGH RISK FOR BREAST CANCER: Primary | ICD-10-CM

## 2025-03-31 NOTE — TELEPHONE ENCOUNTER
Central scheduling Mri of the breast is coming back not covered by medicare can we change the diagnoses code she states that she doesn't know what code that needs to be used. She said both need to be taken out and they now high risk for breast cancer and family history is normally covered

## 2025-04-05 ENCOUNTER — HOSPITAL ENCOUNTER (OUTPATIENT)
Dept: MRI IMAGING | Age: 75
End: 2025-04-05
Attending: FAMILY MEDICINE
Payer: MEDICARE

## 2025-04-05 DIAGNOSIS — Z91.89 AT HIGH RISK FOR BREAST CANCER: ICD-10-CM

## 2025-04-05 DIAGNOSIS — N64.89 OTHER SPECIFIED DISORDERS OF BREAST: ICD-10-CM

## 2025-04-05 DIAGNOSIS — Z80.3 FAMILY HISTORY OF BREAST CANCER: ICD-10-CM

## 2025-04-05 PROCEDURE — C8908 MRI W/O FOL W/CONT, BREAST,: HCPCS

## 2025-04-05 PROCEDURE — 6360000004 HC RX CONTRAST MEDICATION: Performed by: FAMILY MEDICINE

## 2025-04-05 PROCEDURE — A9579 GAD-BASE MR CONTRAST NOS,1ML: HCPCS | Performed by: FAMILY MEDICINE

## 2025-04-05 PROCEDURE — 2500000003 HC RX 250 WO HCPCS: Performed by: FAMILY MEDICINE

## 2025-04-05 PROCEDURE — 2580000003 HC RX 258: Performed by: FAMILY MEDICINE

## 2025-04-05 RX ORDER — 0.9 % SODIUM CHLORIDE 0.9 %
40 INTRAVENOUS SOLUTION INTRAVENOUS ONCE
Status: COMPLETED | OUTPATIENT
Start: 2025-04-05 | End: 2025-04-05

## 2025-04-05 RX ORDER — SODIUM CHLORIDE 0.9 % (FLUSH) 0.9 %
10 SYRINGE (ML) INJECTION ONCE
Status: COMPLETED | OUTPATIENT
Start: 2025-04-05 | End: 2025-04-05

## 2025-04-05 RX ADMIN — SODIUM CHLORIDE, PRESERVATIVE FREE 10 ML: 5 INJECTION INTRAVENOUS at 09:20

## 2025-04-05 RX ADMIN — SODIUM CHLORIDE 40 ML: 9 INJECTION, SOLUTION INTRAVENOUS at 09:20

## 2025-04-05 RX ADMIN — GADOTERIDOL 20 ML: 279.3 INJECTION, SOLUTION INTRAVENOUS at 09:20

## 2025-04-06 ENCOUNTER — RESULTS FOLLOW-UP (OUTPATIENT)
Age: 75
End: 2025-04-06

## 2025-04-06 DIAGNOSIS — N60.02 CYST OF LEFT BREAST: Primary | ICD-10-CM

## 2025-04-24 ENCOUNTER — HOSPITAL ENCOUNTER (OUTPATIENT)
Dept: WOMENS IMAGING | Age: 75
Discharge: HOME | End: 2025-04-26
Payer: MEDICARE

## 2025-04-24 ENCOUNTER — RESULTS FOLLOW-UP (OUTPATIENT)
Age: 75
End: 2025-04-24

## 2025-04-24 DIAGNOSIS — N60.02 CYST OF LEFT BREAST: ICD-10-CM

## 2025-04-24 PROCEDURE — 76642 ULTRASOUND BREAST LIMITED: CPT

## 2025-04-25 ENCOUNTER — INITIAL CONSULT (OUTPATIENT)
Dept: ONCOLOGY | Age: 75
End: 2025-04-25

## 2025-04-25 DIAGNOSIS — Z80.42 FAMILY HISTORY OF PROSTATE CANCER: ICD-10-CM

## 2025-04-25 DIAGNOSIS — Z80.3 FAMILY HISTORY OF BREAST CANCER: Primary | ICD-10-CM

## 2025-04-25 NOTE — PROGRESS NOTES
[facility] Genetic Counseling Program   Hereditary Cancer Risk Assessment     Name: Trinh Garza   YOB: 1950   Date of Consultation: 25   Referred by:   April Matos DO  [address] [place] [id number]    Ms. Garza was seen at the [facility] for genetic counseling on 25.  Ms. Garza was referred by April Matos DO due to her family history of cancer.     PERSONAL HISTORY   Ms. Garza is a 74 y.o.  female with no personal history of cancer.     FAMILY HISTORY  Ms. Garza has three daughters (48, 46, and 44y).     She has one sister and two brothers. Her sister was diagnosed with breast cancer at age 71. She is not aware if her sister had genetic testing. One of her brothers had prostate cancer in his 60s.     She reports that her mother was diagnosed with breast cancer at age 82. Her mother had one sister and one brother. The sister (Ms. Garza's maternal aunt) had breast cancer in her 40-50s. Her maternal grandmother  of an unknown cancer.     She reports that her father had colon cancer in his 80s. Her father had many siblings and there is limited information known about much of the extended family. At least one paternal uncle had throat cancer. Her paternal grandmother and father likely had unknown cancer.     Ms. Garza reports unknown ancestry and denies any known Ashkenazi Moravian heritage.     RISK ASSESSMENT   We discussed that approximately 5-10% of cancers are due to a hereditary gene mutation which causes an increased risk for certain cancers. Hereditary cancers are typically diagnosed at younger ages (under age 50y) and occur in multiple generations of a family. Multiple individuals with the same type of cancer (example: breast or colorectal) or uncommon cancers (example: ovarian, pancreatic, male breast cancer) are also features of hereditary cancers.     In summary, Ms.

## 2025-05-05 RX ORDER — LISINOPRIL 20 MG/1
20 TABLET ORAL DAILY
Qty: 90 TABLET | Refills: 1 | Status: SHIPPED | OUTPATIENT
Start: 2025-05-05

## 2025-05-05 NOTE — TELEPHONE ENCOUNTER
Trinh Garza is calling to request a refill on the following medication(s):    Medication Request:  Requested Prescriptions     Pending Prescriptions Disp Refills    lisinopril (PRINIVIL;ZESTRIL) 20 MG tablet [Pharmacy Med Name: Lisinopril 20 MG Oral Tablet] 90 tablet 0     Sig: Take 1 tablet by mouth once daily       Last Visit Date (If Applicable):  3/10/2025    Next Visit Date:    6/12/2025

## 2025-05-14 ENCOUNTER — TELEPHONE (OUTPATIENT)
Age: 75
End: 2025-05-14

## 2025-05-14 NOTE — TELEPHONE ENCOUNTER
Attempting to contact patient regarding genetic test results. VM box full and unable to leave message. [facility] not set up to receive messages either. Will attempt again.

## 2025-05-29 ENCOUNTER — TELEPHONE (OUTPATIENT)
Age: 75
End: 2025-05-29

## 2025-05-29 NOTE — TELEPHONE ENCOUNTER
Patient would like to stop taking the Bupropion because her stomach is upset, she feels dizzy and it makes her feel indifferent. How should she go about stopping this medication? She said there were all kinds of warnings about stopping without discussing with a doctor. Please advise patient [phone]

## 2025-05-29 NOTE — TELEPHONE ENCOUNTER
Ok, noted.  Take wellbutrin every other day for one week and then one every three days for one week and then can stop

## 2025-06-12 ENCOUNTER — OFFICE VISIT (OUTPATIENT)
Age: 75
End: 2025-06-12

## 2025-06-12 VITALS
DIASTOLIC BLOOD PRESSURE: 88 MMHG | SYSTOLIC BLOOD PRESSURE: 154 MMHG | HEART RATE: 92 BPM | HEIGHT: 64 IN | TEMPERATURE: 97.8 F | OXYGEN SATURATION: 97 % | WEIGHT: 254.8 LBS | BODY MASS INDEX: 43.5 KG/M2

## 2025-06-12 DIAGNOSIS — I10 ESSENTIAL HYPERTENSION: ICD-10-CM

## 2025-06-12 DIAGNOSIS — Z78.0 POSTMENOPAUSAL: ICD-10-CM

## 2025-06-12 DIAGNOSIS — E78.2 MIXED HYPERLIPIDEMIA: ICD-10-CM

## 2025-06-12 DIAGNOSIS — E55.9 VITAMIN D DEFICIENCY: ICD-10-CM

## 2025-06-12 DIAGNOSIS — Z00.00 MEDICARE ANNUAL WELLNESS VISIT, SUBSEQUENT: Primary | ICD-10-CM

## 2025-06-12 DIAGNOSIS — E66.813 OBESITY, CLASS 3 (HCC): ICD-10-CM

## 2025-06-12 DIAGNOSIS — F33.1 MODERATE EPISODE OF RECURRENT MAJOR DEPRESSIVE DISORDER (HCC): ICD-10-CM

## 2025-06-12 ASSESSMENT — PATIENT HEALTH QUESTIONNAIRE - PHQ9
3. TROUBLE FALLING OR STAYING ASLEEP: NOT AT ALL
1. LITTLE INTEREST OR PLEASURE IN DOING THINGS: NEARLY EVERY DAY
8. MOVING OR SPEAKING SO SLOWLY THAT OTHER PEOPLE COULD HAVE NOTICED. OR THE OPPOSITE, BEING SO FIGETY OR RESTLESS THAT YOU HAVE BEEN MOVING AROUND A LOT MORE THAN USUAL: NOT AT ALL
SUM OF ALL RESPONSES TO PHQ QUESTIONS 1-9: 17
9. THOUGHTS THAT YOU WOULD BE BETTER OFF DEAD, OR OF HURTING YOURSELF: NOT AT ALL
4. FEELING TIRED OR HAVING LITTLE ENERGY: NEARLY EVERY DAY
5. POOR APPETITE OR OVEREATING: NEARLY EVERY DAY
7. TROUBLE CONCENTRATING ON THINGS, SUCH AS READING THE NEWSPAPER OR WATCHING TELEVISION: NEARLY EVERY DAY
6. FEELING BAD ABOUT YOURSELF - OR THAT YOU ARE A FAILURE OR HAVE LET YOURSELF OR YOUR FAMILY DOWN: NEARLY EVERY DAY
SUM OF ALL RESPONSES TO PHQ QUESTIONS 1-9: 17
SUM OF ALL RESPONSES TO PHQ QUESTIONS 1-9: 17
10. IF YOU CHECKED OFF ANY PROBLEMS, HOW DIFFICULT HAVE THESE PROBLEMS MADE IT FOR YOU TO DO YOUR WORK, TAKE CARE OF THINGS AT HOME, OR GET ALONG WITH OTHER PEOPLE: SOMEWHAT DIFFICULT
2. FEELING DOWN, DEPRESSED OR HOPELESS: MORE THAN HALF THE DAYS
SUM OF ALL RESPONSES TO PHQ QUESTIONS 1-9: 17

## 2025-06-12 ASSESSMENT — LIFESTYLE VARIABLES
HOW MANY STANDARD DRINKS CONTAINING ALCOHOL DO YOU HAVE ON A TYPICAL DAY: 1 OR 2
HOW OFTEN DO YOU HAVE A DRINK CONTAINING ALCOHOL: MONTHLY OR LESS

## 2025-06-12 NOTE — PATIENT INSTRUCTIONS
for yourself as you start a weight-loss plan?   Set realistic goals. Many people expect to lose much more weight than is likely. A weight loss of 5% to 10% of your body weight may be enough to improve your health.  Get family and friends involved to provide support. Talk to them about why you are trying to lose weight, and ask them to help. They can help by participating in exercise and having meals with you, even if they may be eating something different.  Find what works best for you. If you do not have time or do not like to cook, a program that offers meal replacement bars or shakes may be better for you. Or if you like to prepare meals, finding a plan that includes daily menus and recipes may be best.  Ask your doctor about other health professionals who can help you achieve your weight-loss goals.  A dietitian can help you make healthy changes in your diet.  An exercise specialist or  can help you develop a safe and effective exercise program.  A counselor or psychiatrist can help you cope with issues such as depression, anxiety, or family problems that can make it hard to focus on weight loss.  Consider joining a support group for people who are trying to lose weight. Your doctor can suggest groups in your area.  Where can you learn more?  Go to https://www.7k7k.com.net/patientEd and enter U357 to learn more about \"Starting a Weight-Loss Plan: Care Instructions.\"  Current as of: April 30, 2024  Content Version: 14.5  © 3949-9648 [employer].   Care instructions adapted under license by [employer]. If you have questions about a medical condition or this instruction, always ask your healthcare professional. [employer] disclaims any warranty or liability for your use of this information.         A Healthy Heart: Care Instructions  Overview     Coronary artery disease, also called heart disease, occurs when a substance called plaque builds up in the vessels that supply

## 2025-06-12 NOTE — PROGRESS NOTES
Hx  Sexual   Hx                  
Monocytes Absolute 03/10/2025 0.70     Eosinophils Absolute 03/10/2025 0.07     Basophils Absolute 03/10/2025 0.06     Immature Granulocytes Ab* 03/10/2025 0.04     RBC Morphology 03/10/2025 ANISOCYTOSIS PRESENT         ASSESSMENT/PLAN     1. Medicare annual wellness visit, subsequent  Comments:  living will, POA, full code  2. Essential hypertension  3. Mixed hyperlipidemia  Comments:  LDL- 90  4. Moderate episode of recurrent major depressive disorder (HCC)  Comments:  discontinue Wellbutrin d/t adverse effects  will consider SSRI in the future  GeneSight in reserve  Orders:  -     [facility] -  Referral to Care Management  5. Vitamin D deficiency  Comments:  continue supplement  6. Postmenopausal  -     DEXA BONE DENSITY AXIAL SKELETON; Future  7. Obesity, class 3 (E66.813)  8. Body mass index [BMI] 40.0-44.9, adult (Z68.41)     Advised patient to monitor blood pressure at home while at rest and report readings to the office.   RTC 4 months, or earlier if needed.     Return in about 4 months (around 10/12/2025).    Disposition and Communications:     IDaisy, am scribing for and in the presence of April Matos DO. 6/12/2025    IDr. April DO, personally performed the services described in this documentation as scribed by Daisy Hdz in my presence and the record is both accurate and complete.

## 2025-06-13 ENCOUNTER — CARE COORDINATION (OUTPATIENT)
Dept: CARE COORDINATION | Age: 75
End: 2025-06-13

## 2025-06-13 NOTE — CARE COORDINATION
Ambulatory Care Coordination Note     2025 2:59 PM     Patient Current Location:  Home:  [address]  [facility] [facility] [address]     This patient was received as a referral from Provider.    ACM contacted the patient by telephone. Verified name and  with patient as identifiers. Provided introduction to self, and explanation of the ACM role.   Patient accepted care management services at this time.          ACM: Ester Dueñas RN     Challenges to be reviewed by the provider   Additional needs identified to be addressed with provider No  NA               Method of communication with provider: none.    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 He does not wander and is able to dress and feed himself but does not want to shower or do ADL he will eat if she makes him food and brings it to him. He is not aggressive and does not wonder. Did provide her the number to  [facility] to possibly go to the local support group in [place] she will consider going. Did provide her information about [facility] for caregiver support to give her a break as well as adult day care. She does not feel he needs those yet. She will look into getting him a watch for fall detection and GPS in case he does ever wander. They do have 3 daughters who are very busy and are not really able to help her with his . She does go to a therapist but may be looking for a new one did provide her resources  for a new therapist (psychology today). She did get her dexa scan set up for next week she will fu with her pcp in Oct.   1) Dr Matos 10/16  2) dexa   3) new therapist  4) [facility] for support group    [address]   [address]    Offered patient enrollment in the Remote Patient Monitoring (RPM) program for in-home monitoring: Yes, but did not

## 2025-06-17 ENCOUNTER — HOSPITAL ENCOUNTER (OUTPATIENT)
Dept: MAMMOGRAPHY | Age: 75
Discharge: HOME | End: 2025-06-19
Attending: FAMILY MEDICINE
Payer: MEDICARE

## 2025-06-17 DIAGNOSIS — Z78.0 POSTMENOPAUSAL: ICD-10-CM

## 2025-06-17 PROCEDURE — 77080 DXA BONE DENSITY AXIAL: CPT

## 2025-06-18 ENCOUNTER — RESULTS FOLLOW-UP (OUTPATIENT)
Age: 75
End: 2025-06-18

## 2025-06-20 ENCOUNTER — CARE COORDINATION (OUTPATIENT)
Dept: CARE COORDINATION | Age: 75
End: 2025-06-20

## 2025-06-20 NOTE — CARE COORDINATION
Ambulatory Care Coordination Note     6/20/2025 10:17 AM     Patient outreach attempt by this ACM today to perform care management follow up . ACM was unable to reach the patient by telephone today;   voicemail full and unable to leave a message.      ACM: Ester Dueñas RN     PCP/Specialist follow up:   Future Appointments         Provider Specialty Dept Phone    10/16/2025 1:20 PM April Matos, DO Primary Care [phone]            Follow Up:   Plan for next ACM outreach in approximately 1 week to complete:  - goal progression.

## 2025-06-25 ENCOUNTER — CARE COORDINATION (OUTPATIENT)
Dept: CARE COORDINATION | Age: 75
End: 2025-06-25

## 2025-06-25 NOTE — CARE COORDINATION
Ambulatory Care Coordination Note     2025 10:33 AM     Patient Current Location:  Home:  [address]  [facility] [address]     ACM contacted the patient by telephone. Verified name and  with patient as identifiers.         ACM: Ester Dueñas RN     Challenges to be reviewed by the provider   Additional needs identified to be addressed with provider No  NA               Method of communication with provider: none.    Utilization: Patient has not had any utilization since our last call.     Care Summary Note: spoke with pt who said she feels she is doing better. She is still going to therapy she did not get a new therapist at this time. She feels she is doing ok for now.   1) Dr Matos 10/16  2) lola  done   3) new therapist  4) [facility] for support group    [address]    [address] Thursday   Offered patient enrollment in the Remote Patient Monitoring (RPM) program for in-home monitoring: Yes, but did not enroll at this time: controlled chronic disease management.     Assessments Completed:   General Assessment    Do you have any symptoms that are causing concern?: No          Medications Reviewed:   Patient denies any changes with medications and reports taking all medications as prescribed.    Advance Care Planning:   Not reviewed during this call     Care Planning:    Goals Addressed                   This Visit's Progress     Conditions and Symptoms   On track     I will schedule office visits, as directed by my provider.  I will keep my appointment or reschedule if I have to cancel.  I will notify my provider of any barriers to my plan of care.    Barriers: overwhelmed by complexity of regimen  Plan for overcoming my barriers: care coordination   Confidence: 9/10  Anticipated Goal Completion Date: 25                 PCP/Specialist follow up:   Future Appointments         Provider Specialty Dept Phone    10/16/2025 1:20

## 2025-07-16 ENCOUNTER — CARE COORDINATION (OUTPATIENT)
Dept: CARE COORDINATION | Age: 75
End: 2025-07-16

## 2025-07-16 NOTE — CARE COORDINATION
Ambulatory Care Coordination Note     7/16/2025 9:40 AM     Patient outreach attempt by this ACM today to perform care management follow up . ACM was unable to reach the patient by telephone today;   left voice message requesting a return phone call to this ACM.     ACM: Ester Dueñas RN         PCP/Specialist follow up:   Future Appointments         Provider Specialty Dept Phone    10/16/2025 1:20 PM April Matos,  Primary Care [phone]            Follow Up:   Plan for next ACM outreach in approximately 1-2 days  to complete:  - goal progression.

## 2025-07-18 ENCOUNTER — CARE COORDINATION (OUTPATIENT)
Dept: CARE COORDINATION | Age: 75
End: 2025-07-18

## 2025-07-18 NOTE — CARE COORDINATION
Ambulatory Care Coordination Note     7/18/2025 9:41 AM     Patient outreach attempt by this ACM today to perform care management follow up . [facility] was unable to reach the patient by telephone today;   left voice message requesting a return phone call to this ACM.     ACM: Ester Dueñas RN         PCP/Specialist follow up:   Future Appointments         Provider Specialty Dept Phone    10/16/2025 1:20 PM April Matos,  Primary Care [phone]            Follow Up:   Plan for next AC outreach in approximately 1 week to complete:  - SDOH assessments  - advance care planning.

## 2025-07-24 ENCOUNTER — CARE COORDINATION (OUTPATIENT)
Dept: CARE COORDINATION | Age: 75
End: 2025-07-24

## 2025-07-24 ASSESSMENT — SOCIAL DETERMINANTS OF HEALTH (SDOH)
IN A TYPICAL WEEK, HOW MANY TIMES DO YOU TALK ON THE PHONE WITH FAMILY, FRIENDS, OR NEIGHBORS?: MORE THAN THREE TIMES A WEEK
HOW OFTEN DO YOU GET TOGETHER WITH FRIENDS OR RELATIVES?: MORE THAN THREE TIMES A WEEK
HOW OFTEN DO YOU ATTENT MEETINGS OF THE CLUB OR ORGANIZATION YOU BELONG TO?: NEVER
HOW OFTEN DO YOU ATTEND CHURCH OR RELIGIOUS SERVICES?: 1 TO 4 TIMES PER YEAR
DO YOU BELONG TO ANY CLUBS OR ORGANIZATIONS SUCH AS CHURCH GROUPS UNIONS, FRATERNAL OR ATHLETIC GROUPS, OR SCHOOL GROUPS?: NO

## 2025-07-24 NOTE — CARE COORDINATION
Ambulatory Care Coordination Note     7/24/2025 9:55 AM     Patient outreach attempt by this ACM today to perform care management follow up . [facility] was unable to reach the patient by telephone today;   left voice message requesting a return phone call to this ACM.     ACM: Ester Dueñas RN         PCP/Specialist follow up:   Future Appointments         Provider Specialty Dept Phone    10/16/2025 1:20 PM April Matos,  Primary Care [phone]            Follow Up:   Plan for next AC outreach in approximately 1 week to complete:  - SDOH assessments  - advance care planning.

## 2025-07-24 NOTE — CARE COORDINATION
Ambulatory Care Coordination Note     2025 10:00 AM     Patient Current Location:  Home:   [address]  [facility] [address]     ACM contacted the patient by telephone. Verified name and  with patient as identifiers.         ACM: Ester Dueñas RN     Challenges to be reviewed by the provider   Additional needs identified to be addressed with provider No  NA                Method of communication with provider: none.    Utilization: Patient has not had any utilization since our last call.     Care Summary Note: spoke with  pt who said she is doing well at home she is still going to her therapist she will look into a new one.  She is interested in ACP will send referral to the SW team   1) Dr Matos 10/16  2) lola  done   3) new therapist  4) [facility]zheimer [facility] for support group   5)  for ACP   Offered patient enrollment in the Remote Patient Monitoring (RPM) program for in-home monitoring: Yes, but did not enroll at this time: controlled chronic disease management.     Assessments Completed:   General Assessment    Do you have any symptoms that are causing concern?: No          Medications Reviewed:   Patient denies any changes with medications and reports taking all medications as prescribed.    Advance Care Planning:   Not reviewed during this call     Care Planning:    Goals Addressed    None          PCP/Specialist follow up:   Future Appointments         Provider Specialty Dept Phone    10/16/2025 1:20 PM April Matos DO Primary Care [phone]            Follow Up:   Plan for next ACM outreach in approximately 3 weeks to complete:  - goal progression.   Patient  is agreeable to this plan.

## 2025-08-14 ENCOUNTER — CARE COORDINATION (OUTPATIENT)
Dept: CARE COORDINATION | Age: 75
End: 2025-08-14

## 2025-08-29 ENCOUNTER — CLINICAL SUPPORT (OUTPATIENT)
Age: 75
End: 2025-08-29

## 2025-08-29 ENCOUNTER — TELEPHONE (OUTPATIENT)
Age: 75
End: 2025-08-29

## 2025-09-04 ENCOUNTER — CARE COORDINATION (OUTPATIENT)
Dept: CARE COORDINATION | Age: 75
End: 2025-09-04

## 2025-09-05 ENCOUNTER — CARE COORDINATION (OUTPATIENT)
Dept: CARE COORDINATION | Age: 75
End: 2025-09-05